# Patient Record
Sex: FEMALE | Race: WHITE | URBAN - METROPOLITAN AREA
[De-identification: names, ages, dates, MRNs, and addresses within clinical notes are randomized per-mention and may not be internally consistent; named-entity substitution may affect disease eponyms.]

---

## 2021-02-23 ENCOUNTER — TELEPHONE (OUTPATIENT)
Dept: OBGYN | Age: 41
End: 2021-02-23

## 2021-03-03 ENCOUNTER — TELEPHONE (OUTPATIENT)
Dept: SCHEDULING | Age: 41
End: 2021-03-03

## 2021-04-27 PROBLEM — F41.9 ANXIETY: Status: ACTIVE | Noted: 2021-04-27

## 2021-04-27 PROBLEM — M79.7 FIBROMYALGIA: Status: ACTIVE | Noted: 2021-04-27

## 2021-04-27 PROBLEM — F32.A DEPRESSION: Status: ACTIVE | Noted: 2021-04-27

## 2021-04-27 NOTE — PROGRESS NOTES
Dee Barrera is a 26-year-old woman self-referred for evaluation of possible fibromyalgia. She has had a sensitive stomach since she was 9years old. On endoscopy, she was found to have a healing ulcer in her stomach 10 years ago.   She has avoided no autoimmune disorders or arthritis. She thinks her mother probably has fibromyalgia. Social history:  She is single. She has a steady boyfriend. Her daughter is 5years old. 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 No cigarettes.   Rare up-to-date article on \"fibromyalgia\". She will try gabapentin 100 to 200 mg, 30 minutes before bedtime. She will gradually increase her exercise program.  She will schedule back in 2 weeks. 2.  She needs to establish with a primary care physician.

## 2021-08-05 RX ORDER — GABAPENTIN 100 MG/1
CAPSULE ORAL
Qty: 60 CAPSULE | Refills: 2 | Status: SHIPPED | OUTPATIENT
Start: 2021-08-05 | End: 2021-10-21

## 2021-08-05 NOTE — TELEPHONE ENCOUNTER
Gabapentin Refill  LOV: 4/27/21  No future appointments. Assessment and plan:    1. Depression, anxiety, panic attack, insomnia, fatigue, diffuse myofascial discomfort. We discussed diffuse myofascial pain syndrome.   She was given the up-to-date mariana

## 2021-08-05 NOTE — TELEPHONE ENCOUNTER
Patient requesting refill for medication below and states out of medication and states having pain being without medication.     •  gabapentin 100 MG Oral Cap, Take 1-2 PO Q HS., Disp: 60 capsule, Rfl: 2

## 2021-10-21 ENCOUNTER — PATIENT MESSAGE (OUTPATIENT)
Dept: RHEUMATOLOGY | Facility: CLINIC | Age: 41
End: 2021-10-21

## 2021-10-21 RX ORDER — GABAPENTIN 100 MG/1
CAPSULE ORAL
Qty: 60 CAPSULE | Refills: 2 | Status: SHIPPED | OUTPATIENT
Start: 2021-10-21

## 2021-10-21 NOTE — TELEPHONE ENCOUNTER
Last filled: 9/24/21 #30 cap with 0 refills   LOV: 4/27/21  Future Appointments   Date Time Provider Tl Liat   10/22/2021  1:00 PM Sachi Lancaster, PT [facility]  [address]   10/26/2021  2:00 PM Kimberli Valenzuela, PT [facility]  [facility]   1

## 2021-10-22 NOTE — TELEPHONE ENCOUNTER
From: Denise Huffman MD  To: Eddie Avila  Sent: 10/21/2021 2:23 PM CDT  Subject: Gabapentin refill. I just refilled your gabapentin prescription for 3 months. I hope that you are doing well.     Please schedule a follow-up appointment within

## 2022-02-21 ENCOUNTER — TELEPHONE (OUTPATIENT)
Dept: SCHEDULING | Age: 42
End: 2022-02-21

## 2022-02-23 ENCOUNTER — TELEPHONE (OUTPATIENT)
Dept: SCHEDULING | Age: 42
End: 2022-02-23

## 2022-02-23 ENCOUNTER — NURSE TRIAGE (OUTPATIENT)
Dept: FAMILY MEDICINE CLINIC | Facility: CLINIC | Age: 42
End: 2022-02-23

## 2022-02-24 ENCOUNTER — APPOINTMENT (OUTPATIENT)
Dept: FAMILY MEDICINE | Age: 42
End: 2022-02-24

## 2022-03-03 ENCOUNTER — APPOINTMENT (OUTPATIENT)
Dept: FAMILY MEDICINE | Age: 42
End: 2022-03-03

## 2022-04-28 ENCOUNTER — PATIENT MESSAGE (OUTPATIENT)
Dept: FAMILY MEDICINE CLINIC | Facility: CLINIC | Age: 42
End: 2022-04-28

## 2022-04-28 NOTE — TELEPHONE ENCOUNTER
Please review refill protocol failed/ no protocol  Requested Prescriptions   Pending Prescriptions Disp Refills    ALPRAZolam 0.25 MG Oral Tab 30 tablet 0     Sig: Take 1 tablet (0.25 mg total) by mouth nightly as needed for Sleep or Anxiety.         There is no refill protocol information for this order

## 2022-04-30 RX ORDER — ALPRAZOLAM 0.25 MG/1
0.25 TABLET ORAL NIGHTLY PRN
Qty: 30 TABLET | Refills: 0 | Status: SHIPPED | OUTPATIENT
Start: 2022-04-30

## 2022-05-04 ENCOUNTER — TELEPHONE (OUTPATIENT)
Dept: FAMILY MEDICINE CLINIC | Facility: CLINIC | Age: 42
End: 2022-05-04

## 2022-05-04 RX ORDER — GABAPENTIN 300 MG/1
300 CAPSULE ORAL NIGHTLY
Qty: 90 CAPSULE | Refills: 0 | Status: SHIPPED | OUTPATIENT
Start: 2022-05-04

## 2022-05-13 ENCOUNTER — HOSPITAL ENCOUNTER (EMERGENCY)
Age: 42
Discharge: LEFT BEFORE DISCHARGE | End: 2022-05-13

## 2022-05-13 VITALS
SYSTOLIC BLOOD PRESSURE: 116 MMHG | HEART RATE: 81 BPM | OXYGEN SATURATION: 97 % | TEMPERATURE: 98.1 F | RESPIRATION RATE: 16 BRPM | DIASTOLIC BLOOD PRESSURE: 75 MMHG

## 2022-05-13 RX ORDER — ALPRAZOLAM 0.25 MG/1
0.25 TABLET ORAL
COMMUNITY
Start: 2022-04-30

## 2022-05-13 RX ORDER — GABAPENTIN 300 MG/1
300 CAPSULE ORAL
COMMUNITY
Start: 2022-05-03

## 2022-06-07 NOTE — TELEPHONE ENCOUNTER
Please call patient to review her requests. Can schedule in July for physical to address these issues soon.  Res 24 is ok

## 2022-06-08 RX ORDER — ALPRAZOLAM 0.25 MG/1
0.25 TABLET ORAL NIGHTLY PRN
Qty: 30 TABLET | Refills: 0 | Status: SHIPPED | OUTPATIENT
Start: 2022-06-08

## 2022-06-08 NOTE — TELEPHONE ENCOUNTER
MyChart sent. CSS=please call and schedule, see DR Gonzalez's note below, can schedule an earlier annual physical in July (currently schedule for September ) and use res 24 for medication refill. thanks.        Future Appointments   Date Time Provider Tl Josue   6/21/2022  2:00 PM Jalene Closs, [facility]   9/13/2022  1:30 PM MD NADYA Page C[facility]ER HADLEYO

## 2022-06-08 NOTE — TELEPHONE ENCOUNTER
CSS agents, please call patient and assist patient with rescheduling for earlier appointment for physical. Thank you. Per Dr. David Her, can use a Res24 slot for a time in July to have annual physical earlier than already scheduled appointment in September (see previous charting note from Dr. David Her below).     Future Appointments   Date Time Provider Tl Josue   6/21/2022  2:00 PM Kirstin Puga [facility] [place] [facility]   9/13/2022  1:30 PM MD NADYA [facility] ADO

## 2022-06-08 NOTE — TELEPHONE ENCOUNTER
Patient returned call, agreed to earlier appt for physical, re-scheduled for 7/12/22, soonest available due to work schedule. Patient requesting if able to have refill of Alprazolam until her appt, she will need refill for next week, reports will come in for sooner appt if needed. Please advise. *patient requesting call back to confirm if refill is approved.

## 2022-06-10 NOTE — TELEPHONE ENCOUNTER
Spoke, with the patient and confirmed her appt date. Patient's refill had been sent to the pharmacy.

## 2022-07-12 ENCOUNTER — OFFICE VISIT (OUTPATIENT)
Dept: FAMILY MEDICINE CLINIC | Facility: CLINIC | Age: 42
End: 2022-07-12
Payer: COMMERCIAL

## 2022-07-12 VITALS
BODY MASS INDEX: 36.22 KG/M2 | TEMPERATURE: 98 F | HEART RATE: 70 BPM | SYSTOLIC BLOOD PRESSURE: 112 MMHG | HEIGHT: 66 IN | WEIGHT: 225.38 LBS | DIASTOLIC BLOOD PRESSURE: 76 MMHG

## 2022-07-12 DIAGNOSIS — E56.9 VITAMIN DEFICIENCY: ICD-10-CM

## 2022-07-12 DIAGNOSIS — M79.7 FIBROMYALGIA: Primary | ICD-10-CM

## 2022-07-12 DIAGNOSIS — N92.6 IRREGULAR MENSES: ICD-10-CM

## 2022-07-12 DIAGNOSIS — F41.9 ANXIETY: ICD-10-CM

## 2022-07-12 DIAGNOSIS — Z00.00 ROUTINE MEDICAL EXAM: ICD-10-CM

## 2022-07-12 PROCEDURE — 99396 PREV VISIT EST AGE 40-64: CPT | Performed by: FAMILY MEDICINE

## 2022-07-12 PROCEDURE — 3074F SYST BP LT 130 MM HG: CPT | Performed by: FAMILY MEDICINE

## 2022-07-12 PROCEDURE — 3078F DIAST BP <80 MM HG: CPT | Performed by: FAMILY MEDICINE

## 2022-07-12 PROCEDURE — 3008F BODY MASS INDEX DOCD: CPT | Performed by: FAMILY MEDICINE

## 2022-07-12 RX ORDER — GABAPENTIN 300 MG/1
300 CAPSULE ORAL NIGHTLY
Qty: 90 CAPSULE | Refills: 1 | Status: SHIPPED | OUTPATIENT
Start: 2022-07-12

## 2022-08-20 ENCOUNTER — PATIENT MESSAGE (OUTPATIENT)
Dept: FAMILY MEDICINE CLINIC | Facility: CLINIC | Age: 42
End: 2022-08-20

## 2022-08-22 RX ORDER — ALPRAZOLAM 0.25 MG/1
0.25 TABLET ORAL NIGHTLY PRN
Qty: 30 TABLET | Refills: 0 | Status: SHIPPED | OUTPATIENT
Start: 2022-08-22

## 2022-09-16 ENCOUNTER — LAB ENCOUNTER (OUTPATIENT)
Dept: LAB | Age: 42
End: 2022-09-16
Attending: FAMILY MEDICINE

## 2022-09-16 DIAGNOSIS — E56.9 VITAMIN DEFICIENCY: ICD-10-CM

## 2022-09-16 DIAGNOSIS — N92.6 IRREGULAR MENSES: ICD-10-CM

## 2022-09-16 DIAGNOSIS — Z00.00 ROUTINE MEDICAL EXAM: ICD-10-CM

## 2022-09-16 LAB
ALBUMIN SERPL-MCNC: 3.6 G/DL (ref 3.4–5)
ALBUMIN/GLOB SERPL: 1 {RATIO} (ref 1–2)
ALP LIVER SERPL-CCNC: 85 U/L
ALT SERPL-CCNC: 32 U/L
ANION GAP SERPL CALC-SCNC: 10 MMOL/L (ref 0–18)
AST SERPL-CCNC: 19 U/L (ref 15–37)
BASOPHILS # BLD AUTO: 0.03 X10(3) UL (ref 0–0.2)
BASOPHILS NFR BLD AUTO: 0.5 %
BILIRUB SERPL-MCNC: 1.6 MG/DL (ref 0.1–2)
BUN BLD-MCNC: 10 MG/DL (ref 7–18)
BUN/CREAT SERPL: 12.3 (ref 10–20)
CALCIUM BLD-MCNC: 9.2 MG/DL (ref 8.5–10.1)
CHLORIDE SERPL-SCNC: 103 MMOL/L (ref 98–112)
CHOLEST SERPL-MCNC: 189 MG/DL (ref ?–200)
CO2 SERPL-SCNC: 23 MMOL/L (ref 21–32)
CREAT BLD-MCNC: 0.81 MG/DL
DEPRECATED RDW RBC AUTO: 38.6 FL (ref 35.1–46.3)
EOSINOPHIL # BLD AUTO: 0.04 X10(3) UL (ref 0–0.7)
EOSINOPHIL NFR BLD AUTO: 0.6 %
ERYTHROCYTE [DISTWIDTH] IN BLOOD BY AUTOMATED COUNT: 12.3 % (ref 11–15)
FASTING PATIENT LIPID ANSWER: YES
FASTING STATUS PATIENT QL REPORTED: YES
GFR SERPLBLD BASED ON 1.73 SQ M-ARVRAT: 93 ML/MIN/1.73M2 (ref 60–?)
GLOBULIN PLAS-MCNC: 3.7 G/DL (ref 2.8–4.4)
GLUCOSE BLD-MCNC: 87 MG/DL (ref 70–99)
HCT VFR BLD AUTO: 45.7 %
HDLC SERPL-MCNC: 65 MG/DL (ref 40–59)
HGB BLD-MCNC: 15.5 G/DL
IMM GRANULOCYTES # BLD AUTO: 0.01 X10(3) UL (ref 0–1)
IMM GRANULOCYTES NFR BLD: 0.2 %
INSULIN SERPL-ACNC: 16 MU/L (ref 3–25)
LDLC SERPL CALC-MCNC: 106 MG/DL (ref ?–100)
LYMPHOCYTES # BLD AUTO: 2.63 X10(3) UL (ref 1–4)
LYMPHOCYTES NFR BLD AUTO: 39.9 %
MCH RBC QN AUTO: 29.2 PG (ref 26–34)
MCHC RBC AUTO-ENTMCNC: 33.9 G/DL (ref 31–37)
MCV RBC AUTO: 86.2 FL
MONOCYTES # BLD AUTO: 0.58 X10(3) UL (ref 0.1–1)
MONOCYTES NFR BLD AUTO: 8.8 %
NEUTROPHILS # BLD AUTO: 3.3 X10 (3) UL (ref 1.5–7.7)
NEUTROPHILS # BLD AUTO: 3.3 X10(3) UL (ref 1.5–7.7)
NEUTROPHILS NFR BLD AUTO: 50 %
NONHDLC SERPL-MCNC: 124 MG/DL (ref ?–130)
OSMOLALITY SERPL CALC.SUM OF ELEC: 280 MOSM/KG (ref 275–295)
PLATELET # BLD AUTO: 250 10(3)UL (ref 150–450)
POTASSIUM SERPL-SCNC: 3.9 MMOL/L (ref 3.5–5.1)
PROT SERPL-MCNC: 7.3 G/DL (ref 6.4–8.2)
RBC # BLD AUTO: 5.3 X10(6)UL
SODIUM SERPL-SCNC: 136 MMOL/L (ref 136–145)
TRIGL SERPL-MCNC: 101 MG/DL (ref 30–149)
TSI SER-ACNC: 1.6 MIU/ML (ref 0.36–3.74)
VIT B12 SERPL-MCNC: 407 PG/ML (ref 193–986)
VIT D+METAB SERPL-MCNC: 28.1 NG/ML (ref 30–100)
VLDLC SERPL CALC-MCNC: 17 MG/DL (ref 0–30)
WBC # BLD AUTO: 6.6 X10(3) UL (ref 4–11)

## 2022-09-16 PROCEDURE — 84402 ASSAY OF FREE TESTOSTERONE: CPT

## 2022-09-16 PROCEDURE — 84403 ASSAY OF TOTAL TESTOSTERONE: CPT

## 2022-09-16 PROCEDURE — 82306 VITAMIN D 25 HYDROXY: CPT

## 2022-09-16 PROCEDURE — 83525 ASSAY OF INSULIN: CPT

## 2022-09-16 PROCEDURE — 84443 ASSAY THYROID STIM HORMONE: CPT

## 2022-09-16 PROCEDURE — 36415 COLL VENOUS BLD VENIPUNCTURE: CPT

## 2022-09-16 PROCEDURE — 82607 VITAMIN B-12: CPT

## 2022-09-16 PROCEDURE — 80061 LIPID PANEL: CPT

## 2022-09-16 PROCEDURE — 85025 COMPLETE CBC W/AUTO DIFF WBC: CPT

## 2022-09-16 PROCEDURE — 80053 COMPREHEN METABOLIC PANEL: CPT

## 2022-09-21 NOTE — PROGRESS NOTES
JAVON Lange - Your labs are all normal except mildly decreased vitamin D levels - please take extra vitamin D 2000 units daily.   I am waiting to hear back about the appear for another surgery opinion. - Dr. Rudolph Thomas

## 2022-09-22 RX ORDER — VITAMIN B COMPLEX
3000 TABLET ORAL DAILY
COMMUNITY
Start: 2022-09-22

## 2022-09-22 NOTE — PROGRESS NOTES
Please disregard last sentence about \"surgery status\" - error on my part.    My apologies, Dr. Tyler Oneil

## 2022-09-24 LAB
TESTOSTERONE, FREE, S: 0.69 NG/DL
TESTOSTERONE, TOTAL, S: 41 NG/DL

## 2022-09-29 ENCOUNTER — PATIENT MESSAGE (OUTPATIENT)
Dept: FAMILY MEDICINE CLINIC | Facility: CLINIC | Age: 42
End: 2022-09-29

## 2022-09-30 NOTE — TELEPHONE ENCOUNTER
Dr Carroll Dassamantha also second message regarding gabapentin 400 mg.      Future Appointments   Date Time Provider Tl Josue   10/15/2022 11:30 AM Kyle Neely MD [facility]

## 2022-10-01 RX ORDER — ALPRAZOLAM 0.25 MG/1
0.25 TABLET ORAL NIGHTLY PRN
Qty: 30 TABLET | Refills: 0 | Status: SHIPPED | OUTPATIENT
Start: 2022-10-01

## 2022-10-01 RX ORDER — GABAPENTIN 400 MG/1
400 CAPSULE ORAL 3 TIMES DAILY
Qty: 90 CAPSULE | Refills: 0 | Status: SHIPPED | OUTPATIENT
Start: 2022-10-01

## 2022-10-15 ENCOUNTER — TELEMEDICINE (OUTPATIENT)
Dept: FAMILY MEDICINE CLINIC | Facility: CLINIC | Age: 42
End: 2022-10-15

## 2022-10-15 DIAGNOSIS — Z12.31 VISIT FOR SCREENING MAMMOGRAM: ICD-10-CM

## 2022-10-15 DIAGNOSIS — F41.9 ANXIETY: ICD-10-CM

## 2022-10-15 DIAGNOSIS — F32.A DEPRESSION, UNSPECIFIED DEPRESSION TYPE: ICD-10-CM

## 2022-10-15 DIAGNOSIS — M79.7 FIBROMYALGIA: Primary | ICD-10-CM

## 2022-10-15 PROCEDURE — 99214 OFFICE O/P EST MOD 30 MIN: CPT | Performed by: FAMILY MEDICINE

## 2022-10-15 RX ORDER — DICLOFENAC SODIUM AND MISOPROSTOL 50; 200 MG/1; UG/1
1 TABLET, DELAYED RELEASE ORAL 2 TIMES DAILY
Qty: 60 TABLET | Refills: 0 | Status: SHIPPED | OUTPATIENT
Start: 2022-10-15

## 2022-10-18 ENCOUNTER — PATIENT MESSAGE (OUTPATIENT)
Dept: FAMILY MEDICINE CLINIC | Facility: CLINIC | Age: 42
End: 2022-10-18

## 2022-10-25 RX ORDER — DICLOFENAC SODIUM AND MISOPROSTOL 50; 200 MG/1; UG/1
1 TABLET, DELAYED RELEASE ORAL 2 TIMES DAILY
Qty: 60 TABLET | Refills: 0 | Status: SHIPPED | OUTPATIENT
Start: 2022-10-25

## 2022-10-25 NOTE — TELEPHONE ENCOUNTER
From: Rina Skinner MD  To: Richard Nunez  Sent: 10/18/2022 12:02 PM CDT  Subject: sorry for late reply    Kameron Moctezumadarnell Closs getting back to you later than I expected. I reviewed recommended medications and still same as before. First line is usually Cymbalta but next would be TCA type medications like Nortriptyline or Amitriptyline in evenings or Prozac. Prozac seems to be the most studied SSRI for Fibromyalgia. I would recommend trying that and starting at 10 mg and you can continue on gabapentin. You can also try IBgard for the abdominal discomfort/bloating and gas. It is a peppermint oil supplement but start very slow. Patients reports good results but just start with one capsule daily and drink 2-3 glasses of water with it. Once again, sorry for sending this a bit late. We have a provider out on maternity leave so I am a bit behind on follow ups.    Let me know what you think. - Dr. Ludy Villanueva

## 2022-12-28 RX ORDER — GABAPENTIN 400 MG/1
400 CAPSULE ORAL 3 TIMES DAILY
Qty: 90 CAPSULE | Refills: 0 | Status: SHIPPED | OUTPATIENT
Start: 2022-12-28

## 2022-12-28 NOTE — TELEPHONE ENCOUNTER
Refill passed per [place] [facility] protocol. Med passed protocol, has high interaction, cannot proceed from here  Routing as Dr. Elizabeth Zimmerman is out of office     Requested Prescriptions   Pending Prescriptions Disp Refills    gabapentin 400 MG Oral Cap 90 capsule 0     Sig: Take 1 capsule (400 mg total) by mouth 3 (three) times daily.        Neurology Medications Passed - 12/27/2022  4:56 PM        Passed - In person appointment or virtual visit in the past 6 mos or appointment in next 3 mos     Recent Outpatient Visits              2 months ago 2 Lucero Rosa MD    Telemedicine    5 months ago Eva Rosario MD    Office Visit    10 months ago Lucero Benjamin MD    Office Visit    1 year ago [address], [address] [place]    Office Visit    1 year ago [address], PT    Office Visit                         Recent Outpatient Visits              2 months ago 2 Lucero Rosa MD    Telemedicine    5 months ago Angelina Luna MD    Office Visit    10 months ago Juanita Kelly MD    Office Visit    1 year ago [address], PT    Office Visit    1 year ago [address], [address]    Office Visit

## 2023-01-19 RX ORDER — ALPRAZOLAM 0.25 MG/1
0.25 TABLET ORAL NIGHTLY PRN
Qty: 30 TABLET | Refills: 1 | Status: CANCELLED | OUTPATIENT
Start: 2023-01-19

## 2023-01-20 ENCOUNTER — PATIENT MESSAGE (OUTPATIENT)
Dept: FAMILY MEDICINE CLINIC | Facility: CLINIC | Age: 43
End: 2023-01-20

## 2023-01-20 NOTE — TELEPHONE ENCOUNTER
Patient is calling back to confirm the following medication will be refilled as one more refill is noted for ALPRAZOLAM .   Please advise if patient will need to set up an appointment for a next refill in February. Patient is also requesting the following:    cycloopenzapine    Please advise.

## 2023-01-20 NOTE — TELEPHONE ENCOUNTER
[facility] [address] AT [address], [phone], [phone]      Disp Refills Start End    ALPRAZolam 0.25 MG Oral Tab 30 tablet 1 12/19/2022     Sig - Route:  Take 1 tablet (0.25 mg total) by mouth nightly as needed for Sleep or Anxiety. - Oral    Sent to pharmacy as: ALPRAZolam 0.25 MG Oral Tablet (Xanax)    E-Prescribing Status: Receipt confirmed by pharmacy (12/19/2022 12:28 PM CST)

## 2023-01-21 RX ORDER — CYCLOBENZAPRINE HCL 10 MG
10 TABLET ORAL 2 TIMES DAILY PRN
Qty: 60 TABLET | Refills: 1 | Status: SHIPPED | OUTPATIENT
Start: 2023-01-21 | End: 2023-02-10

## 2023-02-07 RX ORDER — ALPRAZOLAM 0.25 MG/1
0.25 TABLET ORAL NIGHTLY PRN
Qty: 30 TABLET | Refills: 2 | Status: SHIPPED | OUTPATIENT
Start: 2023-02-07

## 2023-02-07 NOTE — TELEPHONE ENCOUNTER
Patient called stating she would like to have refills (at least 1) when she asks for fills. ALPRAZolam 0.25 MG Oral Tab (to be completed by 2/19/23)    Patient will be going out of town 3/2023, she is asking for Rx refill with additional fills. I made her aware I would request above. She was made aware it is PCP's discretion as to how often patient needs to follow-up. I offered a follow-up visit with Dr. Kuhn Backbone scheduled. Future Appointments   Date Time Provider Tl Josue   4/25/2023  1:15 PM Olvin Winslow MD [facility]     Rx pended for Dr. Deirdre Ramírez to review and authorize if appropriate.

## 2023-02-20 RX ORDER — ALPRAZOLAM 0.25 MG/1
TABLET ORAL
Qty: 30 TABLET | Refills: 0 | OUTPATIENT
Start: 2023-02-20

## 2023-04-01 ENCOUNTER — TELEPHONE (OUTPATIENT)
Dept: FAMILY MEDICINE CLINIC | Facility: CLINIC | Age: 43
End: 2023-04-01

## 2023-04-01 RX ORDER — ALPRAZOLAM 0.25 MG/1
0.25 TABLET ORAL 2 TIMES DAILY PRN
Qty: 60 TABLET | Refills: 2 | Status: SHIPPED | OUTPATIENT
Start: 2023-04-01

## 2023-04-01 NOTE — TELEPHONE ENCOUNTER
RN called patient. Patient's date of birth and full name both confirmed. RN informed of provider's message as detailed below. She verbalizes understanding of all information, and agreeable to plan.        Future Appointments   Date Time Provider Tl Liat   4/21/2023  1:00 PM FELIX DOBBINS 1 FELIX DOBBINS  [place]   4/25/2023  1:15 PM [facility]

## 2023-07-01 RX ORDER — GABAPENTIN 400 MG/1
400 CAPSULE ORAL NIGHTLY
Qty: 90 CAPSULE | Refills: 1 | OUTPATIENT
Start: 2023-07-01

## 2023-10-17 RX ORDER — ALPRAZOLAM 0.25 MG/1
0.25 TABLET ORAL 2 TIMES DAILY PRN
Qty: 60 TABLET | Refills: 2 | OUTPATIENT
Start: 2023-10-17

## 2023-10-18 RX ORDER — ALPRAZOLAM 0.25 MG/1
0.25 TABLET ORAL 2 TIMES DAILY PRN
Qty: 60 TABLET | Refills: 0 | Status: SHIPPED | OUTPATIENT
Start: 2023-10-18

## 2023-10-18 NOTE — TELEPHONE ENCOUNTER
Please review refill protocol failed/ no protocol  Requested Prescriptions   Pending Prescriptions Disp Refills    ALPRAZOLAM 0.25 MG Oral Tab [Pharmacy Med Name: ALPRAZOLAM 0.25MG TABLETS] 60 tablet 0     Sig: TAKE 1 TABLET(0.25 MG) BY MOUTH TWICE DAILY AS NEEDED FOR SLEEP OR ANXIETY       There is no refill protocol information for this order

## 2023-10-24 ENCOUNTER — OFFICE VISIT (OUTPATIENT)
Dept: FAMILY MEDICINE CLINIC | Facility: CLINIC | Age: 43
End: 2023-10-24

## 2023-10-24 ENCOUNTER — NURSE TRIAGE (OUTPATIENT)
Dept: FAMILY MEDICINE CLINIC | Facility: CLINIC | Age: 43
End: 2023-10-24

## 2023-10-24 VITALS
SYSTOLIC BLOOD PRESSURE: 136 MMHG | BODY MASS INDEX: 34 KG/M2 | WEIGHT: 210.63 LBS | DIASTOLIC BLOOD PRESSURE: 76 MMHG | HEART RATE: 55 BPM

## 2023-10-24 DIAGNOSIS — R42 DIZZINESS: Primary | ICD-10-CM

## 2023-10-24 DIAGNOSIS — M79.7 FIBROMYALGIA: ICD-10-CM

## 2023-10-24 DIAGNOSIS — Z00.00 ROUTINE MEDICAL EXAM: ICD-10-CM

## 2023-10-24 DIAGNOSIS — F41.9 ANXIETY: ICD-10-CM

## 2023-10-24 DIAGNOSIS — E56.9 VITAMIN DEFICIENCY: ICD-10-CM

## 2023-10-24 DIAGNOSIS — Z12.31 SCREENING MAMMOGRAM FOR BREAST CANCER: ICD-10-CM

## 2023-10-24 PROCEDURE — 3078F DIAST BP <80 MM HG: CPT | Performed by: FAMILY MEDICINE

## 2023-10-24 PROCEDURE — 99214 OFFICE O/P EST MOD 30 MIN: CPT | Performed by: FAMILY MEDICINE

## 2023-10-24 PROCEDURE — 3075F SYST BP GE 130 - 139MM HG: CPT | Performed by: FAMILY MEDICINE

## 2023-10-24 RX ORDER — MECLIZINE HYDROCHLORIDE 25 MG/1
25 TABLET ORAL 3 TIMES DAILY PRN
Qty: 21 TABLET | Refills: 0 | Status: SHIPPED | OUTPATIENT
Start: 2023-10-24

## 2023-10-24 RX ORDER — LEVOCETIRIZINE DIHYDROCHLORIDE 5 MG/1
5 TABLET, FILM COATED ORAL EVERY EVENING
Qty: 30 TABLET | Refills: 0 | Status: SHIPPED | OUTPATIENT
Start: 2023-10-24

## 2023-10-24 RX ORDER — GABAPENTIN 400 MG/1
400 CAPSULE ORAL NIGHTLY
COMMUNITY
Start: 2023-10-24 | End: 2023-10-28

## 2023-10-24 NOTE — TELEPHONE ENCOUNTER
Per conversation with Dr. Cobb Client, ok to add on at 1pm today. Pt contacted and confirmed 1pm.  Confirmed [place] office location. Call transferred to \[facility] [place] [facility]\"" to assist with appointment.     Future Appointments   Date Time Provider Tl Josue   10/24/2023  1:00 PM Tawanna Camarillo MD [facility] ADO

## 2023-10-24 NOTE — TELEPHONE ENCOUNTER
Action Requested: Summary for Provider     []  Critical Lab, Recommendations Needed  [x] Need Additional Advice  []   FYI    []   Need Orders  [] Need Medications Sent to Pharmacy  []  Other     SUMMARY: Pt reports dizziness for 1 week, worsening. Denies vomiting, active bleeding. Pt states she has a history of fibromyalgia, unable to have good night sleep for the last few days. Pt has been drinking extra water. Appointment scheduled with ALVARO Hameed today in [place], but Pt is asking if Dr. Gerardo Solano can add her on to her schedule instead today, as she knows her history. Please advise.       Reason for call: Dizziness (A few weeks ago-minor)  Onset: Data Unavailable                     Reason for Disposition   MODERATE dizziness (e.g., interferes with normal activities) (Exception: dizziness caused by heat exposure, sudden standing, or poor fluid intake)    Protocols used: Dizziness-A-[place]

## 2023-10-25 RX ORDER — LEVOCETIRIZINE DIHYDROCHLORIDE 5 MG/1
5 TABLET, FILM COATED ORAL EVERY EVENING
Qty: 90 TABLET | Refills: 0 | OUTPATIENT
Start: 2023-10-25

## 2023-10-27 ENCOUNTER — TELEPHONE (OUTPATIENT)
Dept: FAMILY MEDICINE CLINIC | Facility: CLINIC | Age: 43
End: 2023-10-27

## 2023-10-27 NOTE — TELEPHONE ENCOUNTER
Patient requesting a letter from Dr Krishan Womack for her  documenting pt has Fibromyalgia, \"specifically document her symptoms, really driving -- it needs to be conveyed with the issue of getting cold, that she can't allow her body to get cold, it will cause her a flare-up. Include in letter: Patient has a car start in her car to warm it up in the winter so she does not get cold. It's necessary as part of her health regimen to keep her out of pain. \"    Requesting this letter by Monday.     Fax to: pt's fax at work:   [phone]

## 2023-10-28 RX ORDER — GABAPENTIN 400 MG/1
400 CAPSULE ORAL 3 TIMES DAILY
Qty: 270 CAPSULE | Refills: 3 | Status: SHIPPED | OUTPATIENT
Start: 2023-10-28

## 2023-10-28 NOTE — TELEPHONE ENCOUNTER
[employer] message sent for  fasting labs instruction.   Refill passed per [facility] protocol.     Requested Prescriptions   Pending Prescriptions Disp Refills    GABAPENTIN 400 MG Oral Cap [Pharmacy Med Name: GABAPENTIN 400MG CAPSULES] 90 capsule 0     Sig: TAKE 1 CAPSULE(400 MG) BY MOUTH THREE TIMES DAILY       Neurology Medications Passed - 10/27/2023  8:02 AM        Passed - In person appointment or virtual visit in the past 6 mos or appointment in next 3 mos     Recent Out[facility]its              [facility]    [facility]    6 months ago Anxiety    [facility] Nara Gamboa MD    Office Visit    1 year ago [facility]romyalgia    [facility]    Tele[facility]icine    1 year ago [facility]romy[facility]    Office Visit    1 year ago [facility]    [facility]          Future Appointments         Provider Department Appt Notes    In 1 week Nara Gonzalez MD [facility] px, last px 7/12/22, ok per MA                            Future Appointments         Provider Department Appt Notes    In 1 week Nara Gonzalez MD [facility] px, last px 7/12/22, ok per MA             Recent Outpatient Visits              4 days ago [facility]    Office Visit    6 months ago [facility]    [facility]    Office Visit    1 year ago [facility]romyalgia    [facility]    Telemedicine    1 year ago [facility]  [place], Nara Gamboa MD    Office Visit    1 year ago Anxiety    [facility] [place], Nara Gamboa MD    Office Visit

## 2023-10-30 NOTE — TELEPHONE ENCOUNTER
Dr. Cobb Client, patient requesting new letter pended for review/approval under communications tab. Thank you. Per chart review, patient requested letter by today. Contacted patient to provide update (name and  of patient verified). Patient states she received fax, and reviewed letter. She explains she needs letter to convey that she cannot allow her body to get cold. She explains her body does not regulate temperature. When the temperature falls below 32 degrees her bones ache and it is extremely painful just walking outside for minutes. She has meeting this week and is requesting letter as soon as possible. She is requesting a new letter as worded below, if possible:  Jaun Lee suffers from Fibromyalgia which will worsen in the winter due to the cold temperatures. Her body does not regulate temperature effectively and she cannot allow her body temperature to fall in cold weather. She needs to take necessary measures to keep herself within normal temperatures such as warming her car before entering it with a car start. Failure to do so will result in fibromyalgia flare up, significant pain, and mobility impairment. It is a necessary part of her routine care and treatment for Fibromyalgia and it's many symptoms. If you require additional information please contact our office.

## 2023-10-30 NOTE — TELEPHONE ENCOUNTER
Letter faxed via Right[employer]ax, confirmation received. Per chart review, patient is MyChart active. LK [facility] message sent.

## 2023-11-01 NOTE — TELEPHONE ENCOUNTER
Per chart review, letter was signed by Dr. Cammie Collins. Left message that updated letter was signed; to call back and transfer to triage if she needs further assistnace  Updated letter faxed via Right[employer]ax, failed fax transmission received. Second attempt made, failed fax transmission received.   [employer] message sent to update patient

## 2023-11-02 ENCOUNTER — TELEPHONE (OUTPATIENT)
Dept: FAMILY MEDICINE CLINIC | Facility: CLINIC | Age: 43
End: 2023-11-02

## 2023-11-02 ENCOUNTER — LAB ENCOUNTER (OUTPATIENT)
Dept: LAB | Age: 43
End: 2023-11-02
Attending: FAMILY MEDICINE
Payer: COMMERCIAL

## 2023-11-02 DIAGNOSIS — E56.9 VITAMIN DEFICIENCY: ICD-10-CM

## 2023-11-02 DIAGNOSIS — Z00.00 ROUTINE MEDICAL EXAM: ICD-10-CM

## 2023-11-02 LAB
ALBUMIN SERPL-MCNC: 4.3 G/DL (ref 3.2–4.8)
ALBUMIN/GLOB SERPL: 1.7 {RATIO} (ref 1–2)
ALP LIVER SERPL-CCNC: 75 U/L
ALT SERPL-CCNC: 11 U/L
ANION GAP SERPL CALC-SCNC: 8 MMOL/L (ref 0–18)
AST SERPL-CCNC: 15 U/L (ref ?–34)
BASOPHILS # BLD AUTO: 0.03 X10(3) UL (ref 0–0.2)
BASOPHILS NFR BLD AUTO: 0.6 %
BILIRUB SERPL-MCNC: 1.5 MG/DL (ref 0.3–1.2)
BUN BLD-MCNC: 8 MG/DL (ref 9–23)
BUN/CREAT SERPL: 10 (ref 10–20)
CALCIUM BLD-MCNC: 9.5 MG/DL (ref 8.7–10.4)
CHLORIDE SERPL-SCNC: 103 MMOL/L (ref 98–112)
CHOLEST SERPL-MCNC: 218 MG/DL (ref ?–200)
CO2 SERPL-SCNC: 26 MMOL/L (ref 21–32)
CREAT BLD-MCNC: 0.8 MG/DL
DEPRECATED RDW RBC AUTO: 39.4 FL (ref 35.1–46.3)
EGFRCR SERPLBLD CKD-EPI 2021: 94 ML/MIN/1.73M2 (ref 60–?)
EOSINOPHIL # BLD AUTO: 0.05 X10(3) UL (ref 0–0.7)
EOSINOPHIL NFR BLD AUTO: 1 %
ERYTHROCYTE [DISTWIDTH] IN BLOOD BY AUTOMATED COUNT: 12.7 % (ref 11–15)
FASTING PATIENT LIPID ANSWER: YES
FASTING STATUS PATIENT QL REPORTED: YES
GLOBULIN PLAS-MCNC: 2.6 G/DL (ref 2.8–4.4)
GLUCOSE BLD-MCNC: 94 MG/DL (ref 70–99)
HCT VFR BLD AUTO: 45.6 %
HDLC SERPL-MCNC: 54 MG/DL (ref 40–59)
HGB BLD-MCNC: 15.7 G/DL
IMM GRANULOCYTES # BLD AUTO: 0.01 X10(3) UL (ref 0–1)
IMM GRANULOCYTES NFR BLD: 0.2 %
INSULIN SERPL-ACNC: 10.5 MU/L (ref 3–25)
LDLC SERPL CALC-MCNC: 148 MG/DL (ref ?–100)
LYMPHOCYTES # BLD AUTO: 2.06 X10(3) UL (ref 1–4)
LYMPHOCYTES NFR BLD AUTO: 42.7 %
MCH RBC QN AUTO: 29.1 PG (ref 26–34)
MCHC RBC AUTO-ENTMCNC: 34.4 G/DL (ref 31–37)
MCV RBC AUTO: 84.6 FL
MONOCYTES # BLD AUTO: 0.42 X10(3) UL (ref 0.1–1)
MONOCYTES NFR BLD AUTO: 8.7 %
NEUTROPHILS # BLD AUTO: 2.26 X10 (3) UL (ref 1.5–7.7)
NEUTROPHILS # BLD AUTO: 2.26 X10(3) UL (ref 1.5–7.7)
NEUTROPHILS NFR BLD AUTO: 46.8 %
NONHDLC SERPL-MCNC: 164 MG/DL (ref ?–130)
OSMOLALITY SERPL CALC.SUM OF ELEC: 282 MOSM/KG (ref 275–295)
PLATELET # BLD AUTO: 244 10(3)UL (ref 150–450)
POTASSIUM SERPL-SCNC: 4.1 MMOL/L (ref 3.5–5.1)
PROT SERPL-MCNC: 6.9 G/DL (ref 5.7–8.2)
RBC # BLD AUTO: 5.39 X10(6)UL
SODIUM SERPL-SCNC: 137 MMOL/L (ref 136–145)
T4 FREE SERPL-MCNC: 1.1 NG/DL (ref 0.8–1.7)
TRIGL SERPL-MCNC: 90 MG/DL (ref 30–149)
TSI SER-ACNC: 1.71 MIU/ML (ref 0.55–4.78)
VIT B12 SERPL-MCNC: 323 PG/ML (ref 211–911)
VIT D+METAB SERPL-MCNC: 19.9 NG/ML (ref 30–100)
VLDLC SERPL CALC-MCNC: 17 MG/DL (ref 0–30)
WBC # BLD AUTO: 4.8 X10(3) UL (ref 4–11)

## 2023-11-02 PROCEDURE — 85025 COMPLETE CBC W/AUTO DIFF WBC: CPT

## 2023-11-02 PROCEDURE — 83525 ASSAY OF INSULIN: CPT

## 2023-11-02 PROCEDURE — 84439 ASSAY OF FREE THYROXINE: CPT

## 2023-11-02 PROCEDURE — 36415 COLL VENOUS BLD VENIPUNCTURE: CPT

## 2023-11-02 PROCEDURE — 84443 ASSAY THYROID STIM HORMONE: CPT

## 2023-11-02 PROCEDURE — 82306 VITAMIN D 25 HYDROXY: CPT

## 2023-11-02 PROCEDURE — 82607 VITAMIN B-12: CPT

## 2023-11-02 PROCEDURE — 80061 LIPID PANEL: CPT

## 2023-11-02 PROCEDURE — 80053 COMPREHEN METABOLIC PANEL: CPT

## 2023-11-02 NOTE — TELEPHONE ENCOUNTER
Patient called and said she needs additional information added to the letter please look at encounter from 10/27. Her court date is tomorrow so will be needing it asap.   Include when diagnosed with fibromyalgia    said It needs to state in detail and convey how brittle bones are and how much pain she is in. Original letter does not go into enough medical detail. Needs to show how fibromyalgia works. Needs it to be more scientific.     Wants the doctor to be notified.     Patient is requesting to speak to a nurse or  today.

## 2023-11-02 NOTE — TELEPHONE ENCOUNTER
I have sent a second letter as requested. Fibromyalgia does not cause \"brittle bones\". Dexa scan would diagnose osteoporosis which Nikia has never had. Can give both letters.   I saw pt on 10/24/2023 and never mentioned these requests.

## 2023-11-03 NOTE — TELEPHONE ENCOUNTER
Dr. Ludy Villanueva, please see follow up messages from patient. RN's only thought is to refer patient to a website or possibly a journal article to supplement her inquiry. Dr. Ludy Villanueva please advise on any recommendations.

## 2023-11-07 ENCOUNTER — OFFICE VISIT (OUTPATIENT)
Dept: FAMILY MEDICINE CLINIC | Facility: CLINIC | Age: 43
End: 2023-11-07

## 2023-11-07 VITALS — HEIGHT: 66 IN | WEIGHT: 210.63 LBS | BODY MASS INDEX: 33.85 KG/M2

## 2023-11-07 DIAGNOSIS — F41.9 ANXIETY: ICD-10-CM

## 2023-11-07 DIAGNOSIS — E56.9 VITAMIN DEFICIENCY: ICD-10-CM

## 2023-11-07 DIAGNOSIS — E78.5 HYPERLIPIDEMIA, UNSPECIFIED HYPERLIPIDEMIA TYPE: ICD-10-CM

## 2023-11-07 DIAGNOSIS — M79.7 FIBROMYALGIA: Primary | ICD-10-CM

## 2023-11-07 DIAGNOSIS — Z00.00 ROUTINE MEDICAL EXAM: ICD-10-CM

## 2023-11-07 PROCEDURE — 3008F BODY MASS INDEX DOCD: CPT | Performed by: FAMILY MEDICINE

## 2023-11-07 PROCEDURE — 99396 PREV VISIT EST AGE 40-64: CPT | Performed by: FAMILY MEDICINE

## 2023-11-07 RX ORDER — GABAPENTIN 100 MG/1
100 CAPSULE ORAL NIGHTLY
Qty: 90 CAPSULE | Refills: 0 | Status: SHIPPED | OUTPATIENT
Start: 2023-11-07

## 2023-11-16 ENCOUNTER — TELEPHONE (OUTPATIENT)
Dept: FAMILY MEDICINE CLINIC | Facility: CLINIC | Age: 43
End: 2023-11-16

## 2023-11-16 NOTE — TELEPHONE ENCOUNTER
Dr. Esther Rockwell - Regarding Letter 11/2/23, patient wants to talk with you Directly  Please call patient as soon as you can today. Phone verified [phone]        Patient called office. Date of birth and full name both confirmed. Requesting call back from Dr. Esther Rockwell. Declines to disclose what is needed, but says it is in regards to a Work Note that needs modification, confirms it is the 11/2/23 note.

## 2023-11-29 ENCOUNTER — PATIENT MESSAGE (OUTPATIENT)
Dept: FAMILY MEDICINE CLINIC | Facility: CLINIC | Age: 43
End: 2023-11-29

## 2023-12-05 ENCOUNTER — TELEPHONE (OUTPATIENT)
Dept: FAMILY MEDICINE CLINIC | Facility: CLINIC | Age: 43
End: 2023-12-05

## 2023-12-05 NOTE — TELEPHONE ENCOUNTER
Spoke with pt,  verified. Pt called asking for a letter from Dr Janett Rodney stated she was dx'd with Fibromyalgia so she can provide a copy to her . She stated a letter given to her before by [facility], it wasn't noted that she was dx'd with Fibromyalgia. Pt was advised she needs to contact [facility] dept and clarify her dx. Pt was also informed per chart reviewed, letter generated on  23 and 23, noted diagnosis of Fibromyalgia. Pt stated she doesn't need to get a new letter as she needs to review previous letter on her mychart with her . No further action needed at this time.        ELIANA

## 2023-12-13 RX ORDER — ALPRAZOLAM 0.25 MG/1
0.25 TABLET ORAL 2 TIMES DAILY PRN
Qty: 60 TABLET | Refills: 3 | Status: SHIPPED | OUTPATIENT
Start: 2023-12-13

## 2023-12-13 NOTE — TELEPHONE ENCOUNTER
Please review. Protocol failed or has no protocol.     Requested Prescriptions   Pending Prescriptions Disp Refills    ALPRAZOLAM 0.25 MG Oral Tab [Pharmacy Med Name: ALPRAZOLAM 0.25MG TABLETS] 60 tablet 0     Sig: TAKE 1 TABLET(0.25 MG) BY MOUTH TWICE DAILY AS NEEDED FOR SLEEP OR ANXIETY       There is no refill protocol information for this order          Recent Outpatient Visits              1 month ago Tessie Amezcua, Giovana Guevara MD    Office Visit    1 month ago Gabrielle Vazquez, Giovana Guevara MD    Office Visit    7 months ago Tessie Chambers, Giovana Guevara MD    Office Visit    1 year ago Tessie Amezcua, Giovana Guevara MD    Telemedicine    1 year ago Tessie Amezcua, Calli Cedeno MD    Office Visit

## 2024-03-19 RX ORDER — GABAPENTIN 100 MG/1
CAPSULE ORAL
Qty: 90 CAPSULE | Refills: 0 | OUTPATIENT
Start: 2024-03-19

## 2024-03-19 NOTE — TELEPHONE ENCOUNTER
Refill passed per [facility] protocol.    *charted, patient is stable on total nightly dose of 500mg (100mg + 400mg)    Please review pended refill request as unable to refill due to high/very high drug warning copied here:    High  Duplicate Therapy: gabapentinGABAPENTIN AND RELATED. Abuse/Dependency Potential.    Requested Prescriptions   Pending Prescriptions Disp Refills    gabapentin 100 MG Oral Cap 90 capsule 0     Sig: Take 1 capsule (100 mg total) by mouth nightly. Plus 400 mg at night       Neurology Medications Passed - 3/17/2024  1:08 PM        Passed - In person appointment or virtual visit in the past 6 mos or appointment in next 3 mos     Recent Outpatient Visits              4 [facility]    Office Visit    4 months ago [facility]izz[facility]    Office Visit    10 months ago Anxiety    [facility] [place] [facility], [facility]    Office Visit    1 year ago Fibromyalgia    [facility]    Telemedicine    1 year ago Fibromyalgia    [facility] [facility] Laura [facility], [facility]    Office Visit          Future Appointments         Provider Department Appt Notes    In 1 month [facility] Usual bi-yearly blood test but can we include testing hormone levels to check for menopause?

## 2024-03-20 RX ORDER — GABAPENTIN 100 MG/1
100 CAPSULE ORAL NIGHTLY
Qty: 90 CAPSULE | Refills: 3 | Status: SHIPPED | OUTPATIENT
Start: 2024-03-20

## 2024-05-14 ENCOUNTER — LAB ENCOUNTER (OUTPATIENT)
Dept: LAB | Age: 44
End: 2024-05-14
Attending: FAMILY MEDICINE

## 2024-05-14 DIAGNOSIS — E56.9 VITAMIN DEFICIENCY: ICD-10-CM

## 2024-05-14 DIAGNOSIS — N92.6 IRREGULAR MENSES: ICD-10-CM

## 2024-05-14 DIAGNOSIS — E78.5 HYPERLIPIDEMIA, UNSPECIFIED HYPERLIPIDEMIA TYPE: ICD-10-CM

## 2024-05-14 LAB
ALBUMIN SERPL-MCNC: 4 G/DL (ref 3.2–4.8)
ALBUMIN/GLOB SERPL: 1.4 {RATIO} (ref 1–2)
ALP LIVER SERPL-CCNC: 78 U/L
ALT SERPL-CCNC: 19 U/L
ANION GAP SERPL CALC-SCNC: 6 MMOL/L (ref 0–18)
AST SERPL-CCNC: 22 U/L (ref ?–34)
BILIRUB SERPL-MCNC: 1.5 MG/DL (ref 0.3–1.2)
BUN BLD-MCNC: 11 MG/DL (ref 9–23)
BUN/CREAT SERPL: 14.5 (ref 10–20)
CALCIUM BLD-MCNC: 9.4 MG/DL (ref 8.7–10.4)
CHLORIDE SERPL-SCNC: 105 MMOL/L (ref 98–112)
CHOLEST SERPL-MCNC: 209 MG/DL (ref ?–200)
CO2 SERPL-SCNC: 29 MMOL/L (ref 21–32)
CREAT BLD-MCNC: 0.76 MG/DL
EGFRCR SERPLBLD CKD-EPI 2021: 100 ML/MIN/1.73M2 (ref 60–?)
FASTING PATIENT LIPID ANSWER: NO
FASTING STATUS PATIENT QL REPORTED: NO
FSH SERPL-ACNC: 80.7 MIU/ML
GLOBULIN PLAS-MCNC: 2.8 G/DL (ref 2–3.5)
GLUCOSE BLD-MCNC: 92 MG/DL (ref 70–99)
HDLC SERPL-MCNC: 66 MG/DL (ref 40–59)
LDLC SERPL CALC-MCNC: 129 MG/DL (ref ?–100)
NONHDLC SERPL-MCNC: 143 MG/DL (ref ?–130)
OSMOLALITY SERPL CALC.SUM OF ELEC: 289 MOSM/KG (ref 275–295)
POTASSIUM SERPL-SCNC: 4.3 MMOL/L (ref 3.5–5.1)
PROT SERPL-MCNC: 6.8 G/DL (ref 5.7–8.2)
SODIUM SERPL-SCNC: 140 MMOL/L (ref 136–145)
TRIGL SERPL-MCNC: 77 MG/DL (ref 30–149)
VIT D+METAB SERPL-MCNC: 44.4 NG/ML (ref 30–100)
VLDLC SERPL CALC-MCNC: 14 MG/DL (ref 0–30)

## 2024-05-14 PROCEDURE — 36415 COLL VENOUS BLD VENIPUNCTURE: CPT

## 2024-05-14 PROCEDURE — 83001 ASSAY OF GONADOTROPIN (FSH): CPT

## 2024-05-14 PROCEDURE — 80053 COMPREHEN METABOLIC PANEL: CPT

## 2024-05-14 PROCEDURE — 80061 LIPID PANEL: CPT

## 2024-05-14 PROCEDURE — 82306 VITAMIN D 25 HYDROXY: CPT

## 2024-05-15 ENCOUNTER — PATIENT MESSAGE (OUTPATIENT)
Dept: FAMILY MEDICINE CLINIC | Facility: CLINIC | Age: 44
End: 2024-05-15

## 2024-05-15 DIAGNOSIS — N95.1 MENOPAUSAL SYMPTOMS: Primary | ICD-10-CM

## 2024-05-15 NOTE — PROGRESS NOTES
Kameron Montejo - Based on your FSH and symptoms, you are in menopause. I did add on those labs as your requested but not needed to give the diagnosis. I recommend you see a gynecologist Dr. Rosales to best treat your symptoms.  I have placed a referral. These labs get released to patients automatically. I receive a significant amount of results daily and cannot review right away - up to 5-7 days. We will contact you about a virtual appointment. I am sorry I was not able to see you yesterday but if patients are more than 15 minutes later, I have to move on to the next patient that is there or everyone is behind on schedule. I try very hard to stay on time if possible. - Dr. Gonzalez

## 2024-05-15 NOTE — TELEPHONE ENCOUNTER
[Also see [employer] message from yesterday, 5/15/24]    Dr. Gonzalez - please review [employer] message and advise

## 2024-05-17 NOTE — TELEPHONE ENCOUNTER
Called patient in regards to result notes and mentioned about the MyChart ( identified name and date of birth )     Appointment made    Future Appointments   Date Time Provider Department Center   5/21/2024  2:30 PM [facility]

## 2024-05-21 ENCOUNTER — TELEMEDICINE (OUTPATIENT)
Dept: FAMILY MEDICINE CLINIC | Facility: CLINIC | Age: 44
End: 2024-05-21

## 2024-05-21 DIAGNOSIS — M79.7 FIBROMYALGIA: ICD-10-CM

## 2024-05-21 DIAGNOSIS — Z12.83 SCREENING EXAM FOR SKIN CANCER: Primary | ICD-10-CM

## 2024-05-21 DIAGNOSIS — N95.1 MENOPAUSAL SYMPTOMS: ICD-10-CM

## 2024-05-21 DIAGNOSIS — Z12.31 SCREENING MAMMOGRAM FOR BREAST CANCER: ICD-10-CM

## 2024-05-21 DIAGNOSIS — K80.20 GALLSTONES: ICD-10-CM

## 2024-05-21 NOTE — PROGRESS NOTES
This visit is conducted using Telemedicine with live, interactive video and audio.    Patient has been referred to the [facility] website at [email or web address] to review the yearly Consent to Treat document.    Patient understands and accepts financial responsibility for any deductible, co-insurance and/or co-pays associated with this service.    Nikia Wasserman is a 44 year old female.  HPI:   Reports menopausal symptoms are prominent - hot flashes, fatigue, decreased libido.   Concerned about her gallstones - feeling more discomfort on right upper quadrant.   Plans to do infra red screening for mammogram but open to do traditional mammogram also this year.   Would like try natural medications for menopause first.   Would like to see dermatologist for overall skin check - multiple moles.   Current Outpatient Medications on File Prior to Visit   Medication Sig Dispense Refill    gabapentin 100 MG Oral Cap Take 1 capsule (100 mg total) by mouth nightly. Take with gabapentin 400mg tablet for total nightly dose of 500mg 90 capsule 3    ALPRAZolam 0.25 MG Oral Tab Take 1 tablet (0.25 mg total) by mouth 2 (two) times daily as needed for Sleep or Anxiety. 60 tablet 3    gabapentin 400 MG Oral Cap Take 1 capsule (400 mg total) by mouth 3 (three) times daily. 270 capsule 3    meclizine 25 MG Oral Tab Take 1 tablet (25 mg total) by mouth 3 (three) times daily as needed. 21 tablet 0    levocetirizine 5 MG Oral Tab Take 1 tablet (5 mg total) by mouth every evening. 30 tablet 0    Cholecalciferol (VITAMIN D) 25 MCG (1000 UT) Oral Tab Take 3,000 Units by mouth daily. (Patient not taking: Reported on 10/24/2023)      Aloe Vera External Gel Apply topically. (Patient not taking: Reported on 4/25/2023)      metRONIDAZOLE 0.75 % External Cream APPLY A THIN LAYER TO THE AFFECTED AREA ON FACE TWICE DAILY (Patient not taking: Reported on 4/25/2023)       No current facility-administered medications on file prior to visit.      Past  Medical History:    Anxiety    Depression      Social History:  Social History     Socioeconomic History    Marital status: Single   Tobacco Use    Smoking status: Former    Smokeless tobacco: Never   Vaping Use    Vaping status: Never Used   Substance and Sexual Activity    Alcohol use: Yes     Comment: socially     Drug use: Yes     Comment: RSO oil        REVIEW OF SYSTEMS:   Review of Systems   See HPI     EXAM:   There were no vitals taken for this visit.  GENERAL: well developed, well nourished,in no apparent distress  Speaking in complete sentences.   ASSESSMENT AND PLAN:   1. Screening exam for skin cancer    - Derm Referral - In Network  - Derm Referral - In Network    2. Menopausal symptoms  Try over the counter Estroven, Black Cohosh. Regular exercise. Avoid alcohol, caffeine, spicy foods, hot temperature liquids and foods.     3. Fibromyalgia  Gabapentin     4. Gallstones  Check US  - US GALLBLADDER (CPT=76705); Future    5. Screening mammogram for breast cancer    - [facility] LUIS 2D+3D SCREENING BILAT (CPT=77067/51677); Future      The patient indicates understanding of these issues and agrees to the plan.      Nara Gonzalez MD  5/21/2024  2:36 PM

## 2024-07-06 ENCOUNTER — PATIENT MESSAGE (OUTPATIENT)
Dept: FAMILY MEDICINE CLINIC | Facility: CLINIC | Age: 44
End: 2024-07-06

## 2024-07-06 DIAGNOSIS — R10.9 ABDOMINAL BLOATING WITH CRAMPS: Primary | ICD-10-CM

## 2024-07-06 DIAGNOSIS — R14.0 ABDOMINAL BLOATING WITH CRAMPS: Primary | ICD-10-CM

## 2024-08-27 ENCOUNTER — HOSPITAL ENCOUNTER (OUTPATIENT)
Dept: MAMMOGRAPHY | Age: 44
Discharge: HOME | End: 2024-08-27
Attending: FAMILY MEDICINE
Payer: COMMERCIAL

## 2024-08-27 DIAGNOSIS — Z12.31 SCREENING MAMMOGRAM FOR BREAST CANCER: ICD-10-CM

## 2024-08-27 PROCEDURE — 77067 SCR MAMMO BI INCL CAD: CPT | Performed by: FAMILY MEDICINE

## 2024-08-27 PROCEDURE — 77063 BREAST TOMOSYNTHESIS BI: CPT | Performed by: FAMILY MEDICINE

## 2024-09-12 RX ORDER — LEVOCETIRIZINE DIHYDROCHLORIDE 5 MG/1
5 TABLET, FILM COATED ORAL EVERY EVENING
Qty: 30 TABLET | Refills: 0 | OUTPATIENT
Start: 2024-09-12

## 2024-09-12 NOTE — TELEPHONE ENCOUNTER
Levocetirizine: last prescribed for 1 month supply in 10/2023. Sent Triage message to patient to call the office

## 2024-09-25 ENCOUNTER — OFFICE VISIT (OUTPATIENT)
Facility: CLINIC | Age: 44
End: 2024-09-25

## 2024-09-25 ENCOUNTER — TELEPHONE (OUTPATIENT)
Facility: CLINIC | Age: 44
End: 2024-09-25

## 2024-09-25 VITALS — WEIGHT: 194.19 LBS | HEIGHT: 66 IN | BODY MASS INDEX: 31.21 KG/M2

## 2024-09-25 DIAGNOSIS — Z12.11 SPECIAL SCREENING FOR MALIGNANT NEOPLASMS, COLON: Primary | ICD-10-CM

## 2024-09-25 DIAGNOSIS — K62.89 RECTAL PAIN: ICD-10-CM

## 2024-09-25 DIAGNOSIS — R19.4 CHANGE IN BOWEL HABITS: Primary | ICD-10-CM

## 2024-09-25 DIAGNOSIS — R10.84 GENERALIZED ABDOMINAL PAIN: ICD-10-CM

## 2024-09-25 DIAGNOSIS — Z87.19 HISTORY OF CHOLELITHIASIS: ICD-10-CM

## 2024-09-25 DIAGNOSIS — Z87.11 HISTORY OF GASTRIC ULCER: ICD-10-CM

## 2024-09-25 DIAGNOSIS — R19.7 DIARRHEA, UNSPECIFIED TYPE: ICD-10-CM

## 2024-09-25 PROCEDURE — 3008F BODY MASS INDEX DOCD: CPT | Performed by: NURSE PRACTITIONER

## 2024-09-25 PROCEDURE — 99244 OFF/OP CNSLTJ NEW/EST MOD 40: CPT | Performed by: NURSE PRACTITIONER

## 2024-09-25 RX ORDER — SODIUM, POTASSIUM,MAG SULFATES 17.5-3.13G
SOLUTION, RECONSTITUTED, ORAL ORAL
Qty: 1 EACH | Refills: 0 | Status: SHIPPED | OUTPATIENT
Start: 2024-09-25

## 2024-09-25 NOTE — H&P
[facility] - Gastroenterology                                                                                                               Reason for consult: rectal pain, change in bowel habits    Requesting physician or provider: No primary care provider on file.    Chief Complaint   Patient presents with    Consult     For Pain in her rectum Inside  it comes and go , stomach issues       HPI:   Nikia Wasserman is a 44 year old year-old female with active diagnoses including depression, anxiety, fibromyalgia, chronic gastritis. Prior medical/surgical history in note table.    Patient states she has a long history of GI issues since childhood. Has previous diagnosis of IBS but doesn't feel comfortable with that diagnosis. Has never had colonoscopy to rule out any other diagnosis. She is here today for evaluation of new onset rectal pain. Pain started June 2024, thinks after eating gluten/dairy free cupcake at her daughter's birthday. That day saw some blood when wiping but has not seen blood since. Does not remember if she was having diarrhea at that time. Feels that whenever she eats any processed foods she feels pain in rectum, not specifically associated with diarrhea or constipation.  For the past few weeks she has been feeling nauseous and having some diarrhea and then constipation. States that rectal pain is always there, not associated with position or activity.     Is very strict with diet, eats high fiber foods, avoids gluten and dairy for >10 years.  Mostly eats chicken, salmon, rice, vegetables. Barely ever eats any processed foods. Feels when she eats anything different feels inflamed in her stomach and can tell where the food is as it is being digested.     Last GI visit 2021 at [facility] for left sided abdominal pain. Peptic duodenitis found on biopsy. Had prior EGD in about 2014 where an ulcer was found. Has  been on IB guard for years, feels she cannot eat without taking it or she has an increase in pain. Takes gabapentin x years, knows if sometimes slows down her digestion. Has chronic pain due to fibromyalgia and therefor has issue defibrinating origin of pain. Has chronic pain in ribs.     Has history of gallstone. Has not followed up with surgery because she is nervous to have it removed with all her current GI symptoms. Has lost 20 lbs without trying since December 2023 due to diet restrictions.       Last colonoscopy: never  Last EGD:  9/10/21 at [facility]:  Pre-op Diagnosis: Abdominal pain, unspecified abdominal location [R10.9]      Post-Op Diagnosis:                ESOPHAGUS:  GE junction at 35 cm. Normal appearing esophageal mucosa.                STOMACH:  Normal appearing stomach mucosa. Gastric biopsy for r/o h pylori               DUODENUM:  Normal duodenal exam (examined up to 2nd portion). Duodenal biopsy taken for rule out celiac disease  A. Duodenum; biopsy:  -Duodenal mucosa with focal heterotopic gastric mucosa and changes suggestive of peptic duodenitis  -Villous architecture otherwise intact, negative for increased intraepithelial lymphocytes    B. Stomach; biopsy:  -Antral type mucosa with reactive changes  -Oxyntic mucosa with no significant pathologic changes  -Negative for Helicobacter       NSAIDS: none  Tobacco: none  Alcohol: none  Marijuana: has medical card, uses marijuana to sleep   Illicit drugs: none    FH GI malignancy: none  FH IBD: none    No history of adverse reaction to sedation  No CLARISSE  No anticoagulants/antiplatelet  No pacemaker/defibrillator    Wt Readings from Last 6 Encounters:   09/25/24 194 lb 3.2 oz (88.1 kg)   11/07/23 210 lb 9.6 oz (95.5 kg)   10/24/23 210 lb 9.6 oz (95.5 kg)   04/25/23 210 lb 3.2 oz (95.3 kg)   07/12/22 225 lb 6.4 oz (102.2 kg)   02/24/22 221 lb 3.2 oz (100.3 kg)        History, Medications, Allergies, ROS:      Past Medical History:    Anxiety     Depression      Past Surgical History:   Procedure Laterality Date            Family Hx:   Family History   Problem Relation Age of Onset    Lipids Father     Dementia Maternal Grandmother     Diabetes Maternal Grandfather     Breast Cancer Maternal Aunt         60's      Social History:   Social History     Socioeconomic History    Marital status: Single   Tobacco Use    Smoking status: Former    Smokeless tobacco: Never   Vaping Use    Vaping status: Never Used   Substance and Sexual Activity    Alcohol use: Yes     Comment: socially     Drug use: Yes     Comment: RSO oil        Medications (Active prior to today's visit):  Current Outpatient Medications   Medication Sig Dispense Refill    Na Sulfate-K Sulfate-Mg Sulf (SUPREP BOWEL PREP KIT) 17.5-3.13-1.6 GM/177ML Oral Solution Suprep kit for colonoscopy prep. To be taken per office instructions. 1 each 0    ALPRAZolam 0.25 MG Oral Tab Take 1 tablet (0.25 mg total) by mouth 2 (two) times daily as needed for Sleep or Anxiety. 60 tablet 0    gabapentin 100 MG Oral Cap Take 1 capsule (100 mg total) by mouth nightly. Take with gabapentin 400mg tablet for total nightly dose of 500mg 90 capsule 3    gabapentin 400 MG Oral Cap Take 1 capsule (400 mg total) by mouth 3 (three) times daily. 270 capsule 3    Cholecalciferol (VITAMIN D) 25 MCG (1000 UT) Oral Tab Take 3,000 Units by mouth daily.      Aloe Vera External Gel Apply topically. (Patient not taking: Reported on 2023)         Allergies:  Allergies   Allergen Reactions    Lactose Intolerant DIARRHEA, FATIGUE, NAUSEA ONLY, PAIN, RESTLESSNESS and SWELLING    Gluten Flour OTHER (SEE COMMENTS)     intolerance    Mold Runny nose     congestion       ROS:   CONSTITUTIONAL: negative for fevers, chills, sweats  EYES Negative for scleral icterus or redness, and diplopia  HEENT: Negative for hoarseness  RESPIRATORY: Negative for cough and severe shortness of breath  CARDIOVASCULAR: Negative for crushing  sub-sternal chest pain  GASTROINTESTINAL: See HPI  GENITOURINARY: Negative for dysuria  MUSCULOSKELETAL: Negative for arthralgias and myalgias  SKIN: Negative for jaundice, rash or pruritus  NEUROLOGICAL: Negative for dizziness and headaches  BEHAVIOR/PSYCH: Negative for psychotic behavior    PHYSICAL EXAM:   Height 5' 6\" (1.676 m), weight 194 lb 3.2 oz (88.1 kg), last menstrual period 03/05/2024.    GEN: Alert, no acute distress, well-nourished   HEENT: anicteric sclera, neck supple, trachea midline, MMM, no palpable or tender neck or supraclavicular lymph nodes  CV: RRR, the extremities are warm and well perfused   LUNGS: No increased work of breathing, CTAB  ABDOMEN: Soft, symmetrical, non-tender without distention or guarding. No scars or lesions. Aorta is without bruit or visible pulsation. Umbilicus is midline without herniation. Normoactive bowel sounds are present, No masses, hepatomegaly or splenomegaly noted.  MSK: No erythema, no warmth, no swelling of joints  SKIN: No jaundice, no erythema, no rashes, no lesions  HEMATOLOGIC: No bleeding, no bruising  NEURO: Alert and interactive, GRAMAJO  PSYCH: appropriate mood & affect    Labs/Imaging/Procedures:     FINDINGS:   LIVER: A macrolobulated T2-hyperintense lesion is noted in the right hepatic lobe posteriorly,   measuring 2.2 x 1.3 x 1.2 cm, with discontiguous peripheral nodular enhancement that progressively   fills in over time, consistent with hemangioma. A very tiny nonenhancing focus in the left lobe   anteriorly on axial series 504 image 17 is too small to catheterize further. No other intrahepatic   lesion.   GALLBLADDER/BILIARY TREE: Cholelithiasis. Small phrygian cap at the fundus.   PANCREAS: Unremarkable.   SPLEEN: Unremarkable.   ADRENAL GLANDS: Unremarkable.   KIDNEYS: Unremarkable.   ABDOMINAL AORTA: Unremarkable.   MESENTERY/RETROPERITONEUM: Unremarkable.   BOWEL: Limited.   ASCITES/SUGGESTION OF FREE AIR: No upper abdominal ascites.   OSSEOUS  STRUCTURES: Unremarkable.   LUNG BASES: Limited.   On coronal series, the uterus is partially visualized. Tiny T2-hyperintense focus along the right   lateral endometrium is nonspecific. Adenomyosis or other process cannot be excluded. Ultrasound   follow-up might be considered.     Impression   IMPRESSION:   1.  Liver hemangioma.   2.  Cholelithiasis.   3.  Incidentally noted tiny T2-hyperintense focus along the right lateral endometrium, nonspecific.   Adenomyosis or other process cannot be excluded. Ultrasound pelvis follow-up might be considered.           .  ASSESSMENT/PLAN:   44 year old female presents for multiple complaints.     1. Change in bowel habits    2. Generalized abdominal pain    3. History of gastric ulcer    4. Rectal pain    5. History of cholelithiasis    Patient wishes to go ahead with colonoscopy at this time. Feels that changes in diet and medication are not helping her symptoms and rectal pain is not severe but does not seem to be resolving. Declines GHULAM today due to pain, states she does not have any external changes. Declines any sort of hemorrhoid or fissure treatment since bleeding and stopped and medication reactions. Has history of an ulcer per patient, no records available today but does not wish to repeat EGD at this time. Has gallbladder US order from PCP that she will complete. Is anxious about completing prep but declines Zofran prescription today. Small volume prep sent.     Recommendations:  -schedule colonoscopy  -follow up after to discuss results        Orders This Visit:  No orders of the defined types were placed in this encounter.      Meds This Visit:  Requested Prescriptions     Signed Prescriptions Disp Refills    Na Sulfate-K Sulfate-Mg Sulf (SUPREP BOWEL PREP KIT) 17.5-3.13-1.6 GM/177ML Oral Solution 1 each 0     Sig: Suprep kit for colonoscopy prep. To be taken per office instructions.       Imaging & Referrals:  None      ALVARO Terrell    [facility] [address]  Gastroenterology  9/25/2024

## 2024-09-25 NOTE — TELEPHONE ENCOUNTER
Patient was seen in office today with  Alexandria Navas and was given orders to schedule. Please call patient to schedule his/her procedure with the orders given below. Patient was given the phone number and prep instructions at the time of the office visit. The prep instructions was also explained to the patient at the time of the visit. The patient verbalized understanding the prep and that a GI  will call him/her for the procedure.  If patient calls before the 1 week turnaround please schedule with the orders given below, thank you!    Orders from Alexandria Navas      Instructions    Have ultrasound that you already ordered:      Schedule colonoscopy with [facility] Endoscopist  Diagnosis: colon cancer screening, diarrhea, rectal pain  Sedation: MAC  Prep: Suprep     2.  bowel prep from pharmacy   You can pick the bowel prep up now and store in a cool, dry place in your home until your scheduled bowel prep start date.     3. Continue all medications as normal for your procedure. DO NOT TAKE: Any form of alcohol, recreational drugs and any forms of erectile dysfunction medications 24 hours prior to procedure.     4. Read all bowel prep instructions carefully. Bowel prep instructions can also be found online at:  [email or web address]      5. AVOID seeds, nuts, popcorn, raw fruits and vegetables for 5 days before procedure     6. If you start any NEW medication after your visit today, please notify us. Certain medications (like iron or weight loss medications) will need to be held before the procedure, or the procedure cannot be performed safely.

## 2024-09-25 NOTE — PATIENT INSTRUCTIONS
Have ultrasound that you already ordered:     Schedule colonoscopy with [facility] Endoscopist  Diagnosis: colon cancer screening, diarrhea, rectal pain  Sedation: MAC  Prep: Suprep    2.  bowel prep from pharmacy   You can pick the bowel prep up now and store in a cool, dry place in your home until your scheduled bowel prep start date.    3. Continue all medications as normal for your procedure. DO NOT TAKE: Any form of alcohol, recreational drugs and any forms of erectile dysfunction medications 24 hours prior to procedure.    4. Read all bowel prep instructions carefully. Bowel prep instructions can also be found online at:  [email or web address]     5. AVOID seeds, nuts, popcorn, raw fruits and vegetables for 5 days before procedure    6. If you start any NEW medication after your visit today, please notify us. Certain medications (like iron or weight loss medications) will need to be held before the procedure, or the procedure cannot be performed safely.

## 2024-09-26 NOTE — TELEPHONE ENCOUNTER
Scheduled for:  Colonoscopy 86488    Provider Name:  Dr Naidu    Date:  10/1/2024    Location:    [facility]    Sedation:  MAC    Time:  100 pm (Patient made aware [facility] will call the day before with an arrival time)    Prep: Suprep    Meds/Allergies Reconciled?:  Physician reviewed     Diagnosis with codes:    Special screening for malignant neoplasms, colon [Z12.11]   Diarrhea, unspecified type [R19.7]  Rectal pain [K62.89]     Was patient informed to call insurance with codes (Y/N):  Yes, I confirmed [employer] [place] insurance with the patient.     Referral sent?:  N/A    [facility] or [facility] notified?:  I sent an electronic request to Endo Scheduling and received a confirmation today.      Medication Orders:  This patient verbally confirmed that she is not taking:   Iron, blood thinners, BP meds, and is not diabetic   Not latex allergy, Not PCN allergy and does not have a pacemaker     Misc Orders:       Further instructions given by staff:  I discussed the prep instructions with the patient which she verbally understood and is aware that I will send the instructions today via [employer].    Advised patient:    You will not be able to drive, operate machinery or make critical decisions the day of your procedure. Please make arrangements for transportation. You must have a  (age 18 or older) to accompany you, stay in the facility for the duration of your procedure and drive you home after the procedure.  You cannot use public transportation (Uber, Lyft, Taxi). The procedure involves sedation, and you will not be allowed to leave unaccompanied. Your procedure will not proceed forward if you're unable to confirm your  planned to escort you home.

## 2024-09-27 ENCOUNTER — TELEPHONE (OUTPATIENT)
Facility: CLINIC | Age: 44
End: 2024-09-27

## 2024-09-27 NOTE — TELEPHONE ENCOUNTER
RN given message that [facility] has not been able to reach pt to do PAT questions. Pt scheduled for colonoscopy on 10/1/24. RN called pt, she states she just spoke to [facility] 30 min ago. RN inquired if she had any questions about procedure or bowel prep instructions, pt states she has already reviewed them with staff.

## 2024-10-01 PROCEDURE — 88305 TISSUE EXAM BY PATHOLOGIST: CPT | Performed by: INTERNAL MEDICINE

## 2024-11-06 ENCOUNTER — TELEPHONE (OUTPATIENT)
Facility: CLINIC | Age: 44
End: 2024-11-06

## 2024-11-06 NOTE — TELEPHONE ENCOUNTER
----- Message from Mireille Naidu sent at 11/4/2024  7:53 PM CST -----  GI staff: please place recall for colonoscopy in 7 years

## 2024-11-06 NOTE — TELEPHONE ENCOUNTER
Recall colonoscopy in 7 years per Dr. Naidu.     Last done 10/1/2024.      Recall entered into Patient Outreach for 10/1/2031.     Health Maintenance updated.

## 2024-11-19 DIAGNOSIS — F41.9 ANXIETY: ICD-10-CM

## 2024-11-20 DIAGNOSIS — F41.9 ANXIETY: ICD-10-CM

## 2024-11-22 ENCOUNTER — TELEPHONE (OUTPATIENT)
Dept: FAMILY MEDICINE CLINIC | Facility: CLINIC | Age: 44
End: 2024-11-22

## 2024-11-22 RX ORDER — ALPRAZOLAM 0.25 MG/1
0.25 TABLET ORAL 2 TIMES DAILY PRN
Qty: 60 TABLET | Refills: 0 | OUTPATIENT
Start: 2024-11-22

## 2024-11-22 RX ORDER — ALPRAZOLAM 0.25 MG/1
0.25 TABLET ORAL 2 TIMES DAILY PRN
Qty: 60 TABLET | Refills: 0 | Status: SHIPPED | OUTPATIENT
Start: 2024-11-22

## 2024-11-22 NOTE — TELEPHONE ENCOUNTER
Routing to pod mate/alternate provider due to High Priority status and Dr. Gonzalez is out of office.     Please review; protocol failed  Medication request is marked high priority: please advise patient states she is completely out of her alprazolam.     Prescription was already routed to Dr. Gonzalez as high priority.    No future appointments.  Last office visit: 5/21/24 (telemedicine)  Last annual physical exam: 11/7/23    Recent fill dates: 8/31/2024  Date of  last written prescription:      Prescription written on 8/16/24 for qty of: #60 each (processed as a 30 day supply)       - Taken twice daily as needed    Requested Prescriptions   Pending Prescriptions Disp Refills    ALPRAZOLAM 0.25 MG Oral Tab [Pharmacy Med Name: ALPRAZOLAM 0.25MG TABLETS] 60 tablet 0     Sig: TAKE 1 TABLET(0.25 MG) BY MOUTH TWICE DAILY AS NEEDED FOR SLEEP OR ANXIETY       Controlled Substance Medication Failed - 11/22/2024  1:46 PM        Failed - This medication is a controlled substance - forward to provider to refill               Recent Outpatient Visits              1 month ago Change in bowel habits    [facility], Alexandria Lee APRN    Office Visit    6 months ago [facility]    Telemedicine    1 year ago Fibromyalgia    [facility]    Office Visit    1 year ago Dizziness    [facility]    Office Visit    1 year ago Anxiety    [facility]    Office Visit

## 2024-11-22 NOTE — TELEPHONE ENCOUNTER
Patient is requesting a refill on her alprazolam  medication. Per the patient she is out of medication. Pharmacy: [facility]/[place], [place] (Listed)     Current Outpatient Medications   Medication Sig Dispense Refill    ALPRAZolam 0.25 MG Oral Tab Take 1 tablet (0.25 mg total) by mouth 2 (two) times daily as needed for Sleep or Anxiety. 60 tablet 0

## 2024-11-22 NOTE — TELEPHONE ENCOUNTER
Verified name and .    Patient states she is out of medication now and is requesting refill of Xanax.    She is aware that Dr. Gonzalez will be back in office tomorrow 24.    No protocol medication.

## 2024-12-17 ENCOUNTER — LAB ENCOUNTER (OUTPATIENT)
Dept: LAB | Age: 44
End: 2024-12-17
Attending: FAMILY MEDICINE
Payer: COMMERCIAL

## 2024-12-17 DIAGNOSIS — E55.9 VITAMIN D DEFICIENCY: ICD-10-CM

## 2024-12-17 DIAGNOSIS — Z01.419 ENCOUNTER FOR WELL WOMAN EXAM WITH ROUTINE GYNECOLOGICAL EXAM: ICD-10-CM

## 2024-12-17 LAB
ALBUMIN SERPL-MCNC: 4.8 G/DL (ref 3.2–4.8)
ALBUMIN/GLOB SERPL: 1.8 {RATIO} (ref 1–2)
ALP LIVER SERPL-CCNC: 85 U/L
ALT SERPL-CCNC: 14 U/L
ANION GAP SERPL CALC-SCNC: 7 MMOL/L (ref 0–18)
AST SERPL-CCNC: 18 U/L (ref ?–34)
BASOPHILS # BLD AUTO: 0.04 X10(3) UL (ref 0–0.2)
BASOPHILS NFR BLD AUTO: 0.9 %
BILIRUB SERPL-MCNC: 1.9 MG/DL (ref 0.3–1.2)
BUN BLD-MCNC: 12 MG/DL (ref 9–23)
BUN/CREAT SERPL: 13.6 (ref 10–20)
CALCIUM BLD-MCNC: 10.1 MG/DL (ref 8.7–10.4)
CHLORIDE SERPL-SCNC: 106 MMOL/L (ref 98–112)
CHOLEST SERPL-MCNC: 215 MG/DL (ref ?–200)
CO2 SERPL-SCNC: 28 MMOL/L (ref 21–32)
CREAT BLD-MCNC: 0.88 MG/DL
DEPRECATED RDW RBC AUTO: 39.2 FL (ref 35.1–46.3)
EGFRCR SERPLBLD CKD-EPI 2021: 84 ML/MIN/1.73M2 (ref 60–?)
EOSINOPHIL # BLD AUTO: 0.03 X10(3) UL (ref 0–0.7)
EOSINOPHIL NFR BLD AUTO: 0.7 %
ERYTHROCYTE [DISTWIDTH] IN BLOOD BY AUTOMATED COUNT: 12.6 % (ref 11–15)
FASTING PATIENT LIPID ANSWER: NO
FASTING STATUS PATIENT QL REPORTED: NO
GLOBULIN PLAS-MCNC: 2.6 G/DL (ref 2–3.5)
GLUCOSE BLD-MCNC: 89 MG/DL (ref 70–99)
HCT VFR BLD AUTO: 44.9 %
HCV AB SERPL QL IA: NONREACTIVE
HDLC SERPL-MCNC: 76 MG/DL (ref 40–59)
HGB BLD-MCNC: 15.8 G/DL
IMM GRANULOCYTES # BLD AUTO: 0.01 X10(3) UL (ref 0–1)
IMM GRANULOCYTES NFR BLD: 0.2 %
LDLC SERPL CALC-MCNC: 126 MG/DL (ref ?–100)
LYMPHOCYTES # BLD AUTO: 1.77 X10(3) UL (ref 1–4)
LYMPHOCYTES NFR BLD AUTO: 39.2 %
MCH RBC QN AUTO: 30 PG (ref 26–34)
MCHC RBC AUTO-ENTMCNC: 35.2 G/DL (ref 31–37)
MCV RBC AUTO: 85.4 FL
MONOCYTES # BLD AUTO: 0.41 X10(3) UL (ref 0.1–1)
MONOCYTES NFR BLD AUTO: 9.1 %
NEUTROPHILS # BLD AUTO: 2.25 X10 (3) UL (ref 1.5–7.7)
NEUTROPHILS # BLD AUTO: 2.25 X10(3) UL (ref 1.5–7.7)
NEUTROPHILS NFR BLD AUTO: 49.9 %
NONHDLC SERPL-MCNC: 139 MG/DL (ref ?–130)
OSMOLALITY SERPL CALC.SUM OF ELEC: 291 MOSM/KG (ref 275–295)
PLATELET # BLD AUTO: 238 10(3)UL (ref 150–450)
POTASSIUM SERPL-SCNC: 4 MMOL/L (ref 3.5–5.1)
PROT SERPL-MCNC: 7.4 G/DL (ref 5.7–8.2)
RBC # BLD AUTO: 5.26 X10(6)UL
SODIUM SERPL-SCNC: 141 MMOL/L (ref 136–145)
T PALLIDUM AB SER QL IA: NONREACTIVE
TRIGL SERPL-MCNC: 71 MG/DL (ref 30–149)
TSI SER-ACNC: 0.68 UIU/ML (ref 0.55–4.78)
VIT B12 SERPL-MCNC: 326 PG/ML (ref 211–911)
VIT D+METAB SERPL-MCNC: 46.4 NG/ML (ref 30–100)
VLDLC SERPL CALC-MCNC: 13 MG/DL (ref 0–30)
WBC # BLD AUTO: 4.5 X10(3) UL (ref 4–11)

## 2024-12-17 PROCEDURE — 86780 TREPONEMA PALLIDUM: CPT

## 2024-12-17 PROCEDURE — 80061 LIPID PANEL: CPT

## 2024-12-17 PROCEDURE — 80053 COMPREHEN METABOLIC PANEL: CPT

## 2024-12-17 PROCEDURE — 87389 HIV-1 AG W/HIV-1&-2 AB AG IA: CPT

## 2024-12-17 PROCEDURE — 86695 HERPES SIMPLEX TYPE 1 TEST: CPT

## 2024-12-17 PROCEDURE — 85025 COMPLETE CBC W/AUTO DIFF WBC: CPT

## 2024-12-17 PROCEDURE — 86696 HERPES SIMPLEX TYPE 2 TEST: CPT

## 2024-12-17 PROCEDURE — 82607 VITAMIN B-12: CPT

## 2024-12-17 PROCEDURE — 86803 HEPATITIS C AB TEST: CPT

## 2024-12-17 PROCEDURE — 82306 VITAMIN D 25 HYDROXY: CPT

## 2024-12-17 PROCEDURE — 36415 COLL VENOUS BLD VENIPUNCTURE: CPT

## 2024-12-17 PROCEDURE — 84443 ASSAY THYROID STIM HORMONE: CPT

## 2024-12-18 LAB
HSV 1 GLYCOPROTEIN G, IGG: NEGATIVE
HSV 2 GLYCOPROTEIN G, IGG: NEGATIVE

## 2024-12-18 NOTE — PROGRESS NOTES
STI testing was negative. Cholesterol levels are improving. Your HDL - good cholesterol is higher rich is protective so your non HDL level is improving. Keep up with regular exercise and healthy eating. Bilirubin levels are mildly elevated but normal liver enzymes AST/ALT so not concerning. - Dr. Gonzalez

## 2025-03-14 DIAGNOSIS — F41.9 ANXIETY: ICD-10-CM

## 2025-03-18 RX ORDER — ALPRAZOLAM 0.25 MG
0.25 TABLET ORAL 2 TIMES DAILY PRN
Qty: 60 TABLET | Refills: 0 | Status: SHIPPED | OUTPATIENT
Start: 2025-03-18

## 2025-03-18 NOTE — TELEPHONE ENCOUNTER
Please review. Refill failed protocol.     Recent fills each # 60 : 11/22/2024  Last prescription written: 11/22/2024  Last office visit:  12/17/2024    Future Appointments   Date Time Provider Department Center   3/18/2025  3:15 PM Sarbjit Proctor DO [facility]

## 2025-04-25 ENCOUNTER — NURSE TRIAGE (OUTPATIENT)
Dept: FAMILY MEDICINE CLINIC | Facility: CLINIC | Age: 45
End: 2025-04-25

## 2025-04-25 NOTE — TELEPHONE ENCOUNTER
Action Requested: Summary for Provider     []  Critical Lab, Recommendations Needed  [x] Need Additional Advice  []   FYI    []   Need Orders  [] Need Medications Sent to Pharmacy  []  Other     SUMMARY: Patient is requesting advice from Dr. Gonzalez, does not know if she really needs to be seen.     Reason for call: Vaginal Bleeding  Onset: 5 days     Spoke to patient, full name and date of birth verified.    Patient's daughter is 14 and 2 months ago, patient had spotting for 1 day around the time daughter got her menstrual cycle that month.     Patient reports that she has a new intimate partner, first time they had intercourse was 3 weeks ago.     Patient reports intimacy 3 times that weekend, each time it was painful.     Patient saw same partner last weekend with 2 episodes of intimacy Saturday and Sunday.     Sunday evening 4/20/25 patient began spotting, today is day 5.   Patient stated she only had red spotting for 1 day, ever since the color is pink.    Patient is not wearing panty liner, pad, or tampon - reports that she only sees the spotting when urinating, it is only on the tissue.     For 2-3 days patient did notice an odor to her urine, but that has resolved and no change to color of her urine.    Patient believes her last period was March 2024, she had spotting for 11 days at that time.    Patient would like to ask Dr. Gonzalez if this is normal after menopause and going about 9 months without any sexual intimacy.     Patient will schedule appointment with any provider if this is what Dr. Gonzalez recommends.     Patient prefers female provider only, she is off work on Tuesdays and Fridays.    Physical is scheduled for September 2025.     RN informed patient that Dr. Gonzalez is not in the office today, we may not be able to call patient back before Monday.     Patient verbalized understanding, she would prefer Dr. Gonzalez's recommendations before scheduling an appointment.      Reason for Disposition   Postmenopausal  vaginal bleeding    Protocols used: Vaginal Bleeding - [id number]

## 2025-04-25 NOTE — TELEPHONE ENCOUNTER
Patient sent a edupristine message requesting an appointment for the following concern:    Been in menopause for 2 years, 18 months last cycle. After sex 1st time in 9 mo spotting pink blood.

## 2025-04-26 NOTE — TELEPHONE ENCOUNTER
No it is normal if post menopausal.  She should not be bleeding.  I would like her to see Dr. Rosales - gynecologist. Please give her information.

## 2025-04-27 ENCOUNTER — PATIENT OUTREACH (OUTPATIENT)
Dept: CASE MANAGEMENT | Age: 45
End: 2025-04-27

## 2025-04-27 NOTE — PROCEDURES
Received order requesting to update Primary Care Physician (PCP) to Dr. Nara Gonzalez is Approved and finalized on April 27, 2025.

## 2025-04-28 NOTE — TELEPHONE ENCOUNTER
From  Antonella Patrick RN To  Nikia Wasserman Sent and Delivered  4/26/2025 11:15 AM   Last Read in MyChart  4/26/2025 11:16 AM by Nikia Wasserman

## 2025-05-02 ENCOUNTER — OFFICE VISIT (OUTPATIENT)
Dept: OBGYN CLINIC | Facility: CLINIC | Age: 45
End: 2025-05-02

## 2025-05-02 VITALS
SYSTOLIC BLOOD PRESSURE: 126 MMHG | HEART RATE: 82 BPM | DIASTOLIC BLOOD PRESSURE: 88 MMHG | WEIGHT: 192 LBS | HEIGHT: 66 IN | BODY MASS INDEX: 30.86 KG/M2

## 2025-05-02 DIAGNOSIS — N89.8 VAGINAL ODOR: ICD-10-CM

## 2025-05-02 DIAGNOSIS — N95.0 POSTMENOPAUSAL BLEEDING: Primary | ICD-10-CM

## 2025-05-02 PROCEDURE — 3079F DIAST BP 80-89 MM HG: CPT | Performed by: NURSE PRACTITIONER

## 2025-05-02 PROCEDURE — 3074F SYST BP LT 130 MM HG: CPT | Performed by: NURSE PRACTITIONER

## 2025-05-02 PROCEDURE — 99214 OFFICE O/P EST MOD 30 MIN: CPT | Performed by: NURSE PRACTITIONER

## 2025-05-02 PROCEDURE — 3008F BODY MASS INDEX DOCD: CPT | Performed by: NURSE PRACTITIONER

## 2025-05-02 NOTE — PROGRESS NOTES
[facility]   Obstetrics and Gynecology    Nikia Wasserman is a 45 year old female  Patient's last menstrual period was 2024.   Chief Complaint   Patient presents with    Gyn Exam     New patient, patient states bleeding for the first time since last year. Per patient she hasn't had her period since 2024. Patient states spotting started after having intercourse.    New patient.. Last period in 2024. She reports 2 weeks ago was intimate with her partner and that evening and noticed bleeding when wiping. Red to pink in color. Lasted about 6 days. No pelvic pain.  No intercourse since then.  Clio was painful despite using lubricant.    She is noticing an odor, no itching.     She has a new partner in next 9 months, had sti testing in 2024 prior to being sexually active with this partner. Reports he had negative testing also.Declines sti testing today  History of Present Illness    Pap:2024 NILM, negative HPV  Contraception: n/a  Mammogram 2024 normal  OBSTETRICS HISTORY:  OB History    Para Term  AB Living   1 1 1 0 0 1   SAB IAB Ectopic Multiple Live Births   0 0 0 0 1       GYNE HISTORY:  Period Cycle (Days): postmenopausa 2024 (2025  1:29 PM)      History   Sexual Activity    Sexual activity: Yes    Partners: Male       MEDICAL HISTORY:  Past Medical History:    Anxiety    Depression       SOCIAL HISTORY:  Social History     Socioeconomic History    Marital status: Single     Spouse name: Not on file    Number of children: Not on file    Years of education: Not on file    Highest education level: Not on file   Occupational History    Not on file   Tobacco Use    Smoking status: Former    Smokeless tobacco: Never   Vaping Use    Vaping status: Never Used   Substance and Sexual Activity    Alcohol use: Yes     Comment: socially     Drug use: Yes     Comment: RSO oil    Sexual activity: Yes     Partners: Male   Other Topics Concern    Not on file    Social History Narrative    Not on file     Social Drivers of Health     Food Insecurity: Not on file   Transportation Needs: Not on file   Stress: Not on file   Housing Stability: Not on file       MEDICATIONS:    Current Outpatient Medications:     ALPRAZolam 0.25 MG Oral Tab, Take 1 tablet (0.25 mg total) by mouth 2 (two) times daily as needed for Sleep or Anxiety., Disp: 60 tablet, Rfl: 0    gabapentin 100 MG Oral Cap, Take 1 capsule (100 mg total) by mouth nightly. Take with gabapentin 400mg tablet for total nightly dose of 500mg, Disp: 90 capsule, Rfl: 3    gabapentin 400 MG Oral Cap, Take 1 capsule (400 mg total) by mouth 3 (three) times daily. (Patient taking differently: Take 1 capsule (400 mg total) by mouth nightly.), Disp: 270 capsule, Rfl: 3    Cholecalciferol (VITAMIN D) 25 MCG (1000 UT) Oral Tab, Take 3,000 Units by mouth daily., Disp: , Rfl:     Aloe Vera External Gel, Apply topically. (Patient not taking: Reported on 12/17/2024), Disp: , Rfl:     ALLERGIES:    Allergies   Allergen Reactions    Lactose Intolerant DIARRHEA, FATIGUE, NAUSEA ONLY, PAIN, RESTLESSNESS and SWELLING    Gluten Flour OTHER (SEE COMMENTS)     intolerance    Mold Runny nose     congestion         Review of Systems:  Constitutional:  Denies fatigue, night sweats, hot flashes  Cardiovascular:  denies chest pain or palpitations  Respiratory:  denies shortness of breath  Gastrointestinal:  denies heartburn, abdominal pain, diarrhea or constipation  Genitourinary:  denies dysuria, incontinence, abnormal vaginal discharge, vaginal itching see HPI  Musculoskeletal:  denies back pain.  Skin/Breast:  Denies any breast pain, lumps, or discharge.   Neurological:  denies headaches, extremity weakness or numbness.  Psychiatric: denies depression or anxiety.  Endocrine:   denies excessive thirst or urination.  Heme/Lymph:  denies history of anemia, easy bruising or bleeding.      PHYSICAL EXAM:     Vitals:    05/02/25 1326   BP: 126/88    Pulse: 82   Weight: 192 lb (87.1 kg)   Height: 5' 6\" (1.676 m)     Body mass index is 30.99 kg/m².     Patient offered chaperone, patient declined    Constitutional: well developed, well nourished    Psychiatric:  Oriented to time, place, person and situation. Appropriate mood and affect    Pelvic Exam:  External Genitalia: normal appearance, hair distribution, and no lesions  Urethral Meatus:  normal in size, location, without lesions and prolapse  Bladder:  No fullness, masses or tenderness  Vagina:  Normal appearance without lesions, thin white discharge  Cervix:  Normal without tenderness on motion  Uterus: normal in size, contour, position, mobility, without tenderness  Adnexa: normal without masses or tenderness  Perineum: normal  Anus: no hemorroids   Lymph node: no inguinal lymph nodes      Assessment & Plan:    ICD-10-CM    1. Postmenopausal bleeding  N95.0 US PELVIS W EV (CPT=76856/02337)      2. Vaginal odor  N89.8 Vaginitis Vaginosis PCR Panel        Assessment & Plan  Postmenopausal bleeding  Reviewed reasons for bleeding.  Recommend pelvic ultrasound to assess ovaries and uterus  Could be related to painful intercourse/dryness  - Consider endometrial biopsy if ultrasound shows thickened endometrial lining.    Vaginal odor  - culture done  Will call if abnormal. If +bacterial vaginosis - patient prefers metrogel vaginal medication        Ada Curtis, ALVARO        This note was prepared using Dragon Medical voice recognition dictation software. As a result errors may occur. When identified these errors have been corrected. While every attempt is made to correct errors during dictation discrepancies may still exist.

## 2025-05-02 NOTE — PROGRESS NOTES
The following individual(s) verbally consented to be recorded using ambient AI listening technology and understand that they can each withdraw their consent to this listening technology at any point by asking the clinician to turn off or pause the recording:    Patient name: Nikia DONNA Wasserman

## 2025-05-03 LAB
BV BACTERIA DNA VAG QL NAA+PROBE: POSITIVE
C GLABRATA DNA VAG QL NAA+PROBE: NEGATIVE
C KRUSEI DNA VAG QL NAA+PROBE: NEGATIVE
CANDIDA DNA VAG QL NAA+PROBE: NEGATIVE
T VAGINALIS DNA VAG QL NAA+PROBE: NEGATIVE

## 2025-05-09 RX ORDER — GABAPENTIN 100 MG/1
CAPSULE ORAL
Qty: 90 CAPSULE | Refills: 3 | Status: SHIPPED | OUTPATIENT
Start: 2025-05-09

## 2025-05-20 ENCOUNTER — HOSPITAL ENCOUNTER (OUTPATIENT)
Dept: ULTRASOUND IMAGING | Age: 45
Discharge: HOME | End: 2025-05-20
Attending: NURSE PRACTITIONER
Payer: COMMERCIAL

## 2025-05-20 DIAGNOSIS — N95.0 POSTMENOPAUSAL BLEEDING: ICD-10-CM

## 2025-05-20 PROCEDURE — 76856 US EXAM PELVIC COMPLETE: CPT | Performed by: NURSE PRACTITIONER

## 2025-05-20 PROCEDURE — 76830 TRANSVAGINAL US NON-OB: CPT | Performed by: NURSE PRACTITIONER

## 2025-05-21 ENCOUNTER — PATIENT MESSAGE (OUTPATIENT)
Dept: OBGYN CLINIC | Facility: CLINIC | Age: 45
End: 2025-05-21

## 2025-05-21 ENCOUNTER — TELEPHONE (OUTPATIENT)
Dept: OBGYN CLINIC | Facility: CLINIC | Age: 45
End: 2025-05-21

## 2025-05-29 ENCOUNTER — TELEPHONE (OUTPATIENT)
Dept: OBGYN CLINIC | Facility: CLINIC | Age: 45
End: 2025-05-29

## 2025-05-29 NOTE — TELEPHONE ENCOUNTER
Patient was informed of need for endometrial biopsy.  (See 5/21/2024 mychart message.  Appointment scheduled for endometrial biopsy on 6/3/2025.  Patient states she cannot take Motrin and Tylenol does not help at all.  Patient has fibromyalgia and inflammation of her stomach lining.  Patient asked of Ada Curtis has done this procedure on someone with fibromyalgia.  I advised I am unsure but I can ask.  Patient declines having RN ask about this.  Procedure was explained to patient and she asked if there is a way anesthetic can be done at the biopsy site  or if there is an easier way to have biopsy done.  Patient informed the biopsy is of the inside of her uterus.  If the biopsy is not done through the vagina an incision would need to be made into her abdomen and uterus to get to the lining.  Patient advised we can check with Ada Curtis to see if endometrial biopsy can be done under general anesthesia.  Patient declines.  Patient will not be taking any pain meds prior to appointment.  Patient will discuss getting an prescription for pain meds to help with cramping after the exam.    Patient also reports chunky discharge.  States it the shower she was cleaning her vaginal and there was a 1 cm chunk of white discharge that was in her cervix.  I did verify that this was found inside the vagina.  Patient states it looked like cauliflower.  Patient states today there was not more chunks.  Patient denies vaginal itching pr burning.  Patient wanted Ada Curtis to be aware of this and will discuss if testing is needed and her endometrial biopsy appointment.    Message to Ada Curtis as ELIANA.

## 2025-06-03 ENCOUNTER — OFFICE VISIT (OUTPATIENT)
Dept: OBGYN CLINIC | Facility: CLINIC | Age: 45
End: 2025-06-03

## 2025-06-03 VITALS
SYSTOLIC BLOOD PRESSURE: 107 MMHG | WEIGHT: 194.19 LBS | DIASTOLIC BLOOD PRESSURE: 75 MMHG | HEART RATE: 64 BPM | BODY MASS INDEX: 31 KG/M2

## 2025-06-03 DIAGNOSIS — N95.0 POSTMENOPAUSAL BLEEDING: Primary | ICD-10-CM

## 2025-06-03 PROCEDURE — 99212 OFFICE O/P EST SF 10 MIN: CPT | Performed by: NURSE PRACTITIONER

## 2025-06-03 PROCEDURE — 3078F DIAST BP <80 MM HG: CPT | Performed by: NURSE PRACTITIONER

## 2025-06-03 PROCEDURE — 3074F SYST BP LT 130 MM HG: CPT | Performed by: NURSE PRACTITIONER

## 2025-06-03 NOTE — PROGRESS NOTES
[facility]   Obstetrics and Gynecology    Nikia Wasserman is a 45 year old female  Patient's last menstrual period was 2024.   Chief Complaint   Patient presents with    Procedure     EMBX   .   History of Present Illness  Patient last seen on . She had not had a period since 3/2024 then had pink bleeding in April for 6 days after intercourse with wiping.. patient reports intercourse was painful and reports had not been intimate for a while prior to that.    No bleeding since then. No pelvic pain. She had a pelvic ultrasound on :  An ultrasound showed a thickened endometrial lining of 11 millimeters and a small cyst under one centimeter on the left ovary.   I reviewed ultrasound with her today and recommendations for endometrial biopsy. Reviewed procedure in detail, risks, benefits. Reviewed reasons for postmenopausal bleeding - polyps, hyperplasia and uterine cancer. Recommend endometrial evaluation. Patient is concerned about her pain and pain afterward.     Reviewed alternative option is to meet with MD to discuss hysteroscopy dilation and curettage. Reviewed this procedure with patient to best of my abilities. Patient most comfortable with this option, declines in office biopsy. Will schedule consult with MD.    She has fibromyalgia, affecting her pain tolerance, and cannot take ibuprofen or other anti-inflammatories due to chronic stomach inflammation.     Pap:2024 NILM, negative HPV  Contraception: n/a  Mammogram 2024 normal    Narrative   PROCEDURE: US PELVIS W EV (CPT=76856/12538)     COMPARISON: None.     INDICATIONS:  Last menstrual period reported 3/1/2024. Postmenopausal bleeding     TECHNIQUE: Pelvic ultrasound using transabdominal and transvaginal technique.  A transvaginal scan was performed for endometrial and adnexal     FINDINGS:  UTERUS:   Measures 6.2 x 3.2 x 3.7 cm     ENDOMETRIUM: Heterogeneous ill-defined endometrium measuring 11 mm.  Differential would  include  MYOMETRIUM: Anterior uterine body/fundal leiomyoma measuring 8 x 7 mm     OVARIES AND ADNEXA:  Bilateral diminished ovarian volume consistent with mild atrophic changes.     RIGHT:   Measures 2.3 x 1.2 x 1.6 cm. Normal appearance with no masses.    LEFT:   Measures 2.2 x 1.1 x 1.2 cm.  Benign ovarian cyst measuring 0.8 cm     CUL-DE-SAC:   Normal.  No free fluid or mass.    OTHER: Negative.  Bladder appears normal.                 Impression   CONCLUSION:  1. Small subcentimeter uterine leiomyoma anterior body fundal region.  2. Heterogeneous ill-defined thickened endometrium for postmenopausal status measures 11 mm.  Differential would include medication induced endometrial changes atypical hyperplasia versus endometrial neoplasm.  Recommend histologic correlation..  3. Mildly atrophic bilateral ovaries..  Benign subcentimeter cyst left ovary..             Dictated by (CST): Fredi Dye MD on 2025 at 4:46 PM      Finalized by (CST): Fredi Dye MD on 2025 at 4:53 PM  PLEASE FAX REPORT         OBSTETRICS HISTORY:  OB History    Para Term  AB Living   1 1 1 0 0 1   SAB IAB Ectopic Multiple Live Births   0 0 0 0 1       GYNE HISTORY:  Period Cycle (Days): postmenopausa 2024 (2025  1:29 PM)      History   Sexual Activity    Sexual activity: Yes    Partners: Male       MEDICAL HISTORY:  Past Medical History:    Anxiety    Depression       SOCIAL HISTORY:  Social History     Socioeconomic History    Marital status: Single     Spouse name: Not on file    Number of children: Not on file    Years of education: Not on file    Highest education level: Not on file   Occupational History    Not on file   Tobacco Use    Smoking status: Former    Smokeless tobacco: Never   Vaping Use    Vaping status: Never Used   Substance and Sexual Activity    Alcohol use: Yes     Comment: socially     Drug use: Yes     Comment: RSO oil    Sexual activity: Yes     Partners: Male    Other Topics Concern    Not on file   Social History Narrative    Not on file     Social Drivers of Health     Food Insecurity: Not on file   Transportation Needs: Not on file   Stress: Not on file   Housing Stability: Not on file       MEDICATIONS:    Current Outpatient Medications:     gabapentin 100 MG Oral Cap, TAKE 1 CAPSULE BY MOUTH NIGHTLY. TAKE WITH GABAPENTIN 400MG CAPLET FOR NIGHLTY DOSE OF 500MG, Disp: 90 capsule, Rfl: 3    ALPRAZolam 0.25 MG Oral Tab, Take 1 tablet (0.25 mg total) by mouth 2 (two) times daily as needed for Sleep or Anxiety., Disp: 60 tablet, Rfl: 0    gabapentin 400 MG Oral Cap, Take 1 capsule (400 mg total) by mouth 3 (three) times daily., Disp: 270 capsule, Rfl: 3    Cholecalciferol (VITAMIN D) 25 MCG (1000 UT) Oral Tab, Take 3,000 Units by mouth daily., Disp: , Rfl:     Aloe Vera External Gel, Apply topically. (Patient not taking: Reported on 6/3/2025), Disp: , Rfl:     ALLERGIES:    Allergies   Allergen Reactions    Lactose Intolerant DIARRHEA, FATIGUE, NAUSEA ONLY, PAIN, RESTLESSNESS and SWELLING    Gluten Flour OTHER (SEE COMMENTS)     intolerance    Mold Runny nose     congestion         Review of Systems:  Constitutional:  Denies fatigue, night sweats, hot flashes  Cardiovascular:  denies chest pain or palpitations  Respiratory:  denies shortness of breath  Gastrointestinal:  denies heartburn, abdominal pain, diarrhea or constipation  Genitourinary:  denies dysuria, incontinence, abnormal vaginal discharge, vaginal itching see HPI  Musculoskeletal:  denies back pain.  Skin/Breast:  Denies any breast pain, lumps, or discharge.   Neurological:  denies headaches, extremity weakness or numbness.  Psychiatric: denies depression or anxiety.  Endocrine:   denies excessive thirst or urination.  Heme/Lymph:  denies history of anemia, easy bruising or bleeding.      PHYSICAL EXAM:     Vitals:    06/03/25 1214   BP: 107/75   Pulse: 64   Weight: 194 lb 3.2 oz (88.1 kg)     Body mass index  is 31.34 kg/m².         Constitutional: well developed, well nourished    Psychiatric:  Oriented to time, place, person and situation. Appropriate mood and affect          Assessment & Plan:    ICD-10-CM    1. Postmenopausal bleeding  N95.0           Assessment & Plan  Reviewed concern with postmenopausal bleeding - recommend endometrial sampling.    Reviewed option for endometrial biopsy vs. Consult with MD for hysteroscopy. Prefers consult for hysteroscopy with MD.    Ovarian Cyst Small left ovarian cyst, likely benign. Monitoring recommended. - Repeat pelvic ultrasound in six months to monitor the ovarian cyst.       ALVARO Arreguin        This note was prepared using Dragon Medical voice recognition dictation software. As a result errors may occur. When identified these errors have been corrected. While every attempt is made to correct errors during dictation discrepancies may still exist.

## 2025-06-06 ENCOUNTER — TELEMEDICINE (OUTPATIENT)
Dept: OBGYN CLINIC | Facility: CLINIC | Age: 45
End: 2025-06-06
Payer: COMMERCIAL

## 2025-06-06 ENCOUNTER — TELEPHONE (OUTPATIENT)
Dept: OBGYN CLINIC | Facility: CLINIC | Age: 45
End: 2025-06-06

## 2025-06-06 DIAGNOSIS — R93.89 THICKENED ENDOMETRIUM: Primary | ICD-10-CM

## 2025-06-06 DIAGNOSIS — M79.7 FIBROMYALGIA: ICD-10-CM

## 2025-06-06 DIAGNOSIS — N92.4 ABNORMAL PERIMENOPAUSAL BLEEDING: ICD-10-CM

## 2025-06-06 RX ORDER — ESTRADIOL 0.1 MG/G
CREAM VAGINAL
Qty: 42.5 G | Refills: 0 | Status: SHIPPED | OUTPATIENT
Start: 2025-06-06

## 2025-06-07 NOTE — PROGRESS NOTES
Nikia Wasserman is a 45 year old female here today for a telemedicine/video visit.     Seen EMB 6/3/25 for postmenopausal bleeding.   Had irregular periods.  LMP 3/1/25 that was very light.  She had some pink discharge x 1 day in 12/2024 and in 2/2025.    She then had spotting for 4-5 days on 4/2025, in timing with her daughter period.  Also had some spotting with intercourse.   She was treated for bacterial vaginosis on 5/2/25.      Pelvic ultrasound on 5/20/25--heterogeneous ill defined endometrium 11 mm.  8mm fibroid.  Normal ovaries.    FSH on 5/14/24--menopausal range.      Pt with fibromyalgia and on gabapentin.  She declined e-bx at office with EMB due to pain.    Here to discuss hysteroscopy and D&C    Pt also having some hot flashes and decreased libido.  Under increased stress.  Having vaginal dryness.  Wants estrogen cream.    Impression:  Perimenopausal bleeding.  Thickened endometrium.  Vaginal dryness    Plan:  will schedule hysteroscopy with D&C.  Risks discussed.  Since pt unable to take NSAIDs and very concerned about post op pain, discussed home with a few narcotics.  Rx Estrace vaginal cream.      Total time in discussion:  28m 46s

## 2025-06-07 NOTE — TELEPHONE ENCOUNTER
Please schedule the following surgery:    Procedure: Hysteroscopy with possible Truclear and D&C    Date:     Diagnosis:  Thickened endometrium.  Perimenopausal bleeding.  Fibromyalgia    Admission:Day surgery    Anesth: general    Preop Medical Clearance needed:  No    PCN allergy:  no antibiotic needed    Additional Orders: routine orders    Additional equipment:  Truclear    Rep needed: No    Additional surgery time needed: No    IPA tubal / hyst form signed: not applicable    Comments / Orders to Nurse:      Discussed possible complications including but not limited to:  anesthesia risks, bleeding, infection, and perforation of uterus    Follow-up: 2 weeks

## 2025-06-09 NOTE — TELEPHONE ENCOUNTER
Soonest available is Mon,7/21. To Dr. Ray please advise if this is OK of is their any where else on your schedule we can add patient

## 2025-06-09 NOTE — TELEPHONE ENCOUNTER
Spoke to patient. Aware surgery is scheduled on Tues,8/5/25 at 730am. Assisted in scheduling post-op appointment.    Per [employer], NO prior authorization is needed for surgery.  No Action Required  Reference Number  [id number]      Minor case instructions sent via [employer]    Delayed staff message sent to MD to please place pre-op orders    Entered in book and calendar

## 2025-06-09 NOTE — TELEPHONE ENCOUNTER
Spoke to patient. States surgery HAS to be scheduled on a Tues.    Please advise if OK to add post call on: Tues,8/5 in AM

## 2025-06-13 DIAGNOSIS — F41.9 ANXIETY: ICD-10-CM

## 2025-06-16 RX ORDER — ALPRAZOLAM 0.25 MG
0.25 TABLET ORAL 2 TIMES DAILY PRN
Qty: 60 TABLET | Refills: 0 | Status: SHIPPED | OUTPATIENT
Start: 2025-06-16

## 2025-06-16 RX ORDER — GABAPENTIN 400 MG/1
400 CAPSULE ORAL 3 TIMES DAILY
Qty: 270 CAPSULE | Refills: 3 | Status: SHIPPED | OUTPATIENT
Start: 2025-06-16

## 2025-06-16 NOTE — TELEPHONE ENCOUNTER
Refill failed protocol. Please review due High Duplicate Therapy: gabapentin GABAPENTIN AND RELATED. Abuse/Dependency Potential.    Recent fills each # 60 : 3/18/25  Last prescription written: 3/18/25  Last office visit:  12/17/2024    Future Appointments   Date Time Provider Department Center   8/19/2025  6:40 PM Conchis Ray MD [facility]   9/9/2025  1:30 PM Nara Gonzalez MD [facility]

## 2025-07-24 ENCOUNTER — TELEPHONE (OUTPATIENT)
Dept: OBGYN CLINIC | Facility: CLINIC | Age: 45
End: 2025-07-24

## 2025-08-12 ENCOUNTER — PATIENT MESSAGE (OUTPATIENT)
Dept: FAMILY MEDICINE CLINIC | Facility: CLINIC | Age: 45
End: 2025-08-12

## 2025-08-12 ENCOUNTER — NURSE TRIAGE (OUTPATIENT)
Dept: FAMILY MEDICINE CLINIC | Facility: CLINIC | Age: 45
End: 2025-08-12

## 2025-08-14 ENCOUNTER — OFFICE VISIT (OUTPATIENT)
Dept: INTERNAL MEDICINE CLINIC | Facility: CLINIC | Age: 45
End: 2025-08-14

## 2025-08-14 VITALS
WEIGHT: 192 LBS | HEIGHT: 66 IN | TEMPERATURE: 99 F | SYSTOLIC BLOOD PRESSURE: 106 MMHG | OXYGEN SATURATION: 100 % | HEART RATE: 64 BPM | DIASTOLIC BLOOD PRESSURE: 75 MMHG | BODY MASS INDEX: 30.86 KG/M2

## 2025-08-14 DIAGNOSIS — S80.862A INSECT BITE OF LEFT LOWER LEG, INITIAL ENCOUNTER: Primary | ICD-10-CM
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
## 2025-08-14 PROCEDURE — 3074F SYST BP LT 130 MM HG: CPT | Performed by: NURSE PRACTITIONER

## 2025-08-14 PROCEDURE — 3008F BODY MASS INDEX DOCD: CPT | Performed by: NURSE PRACTITIONER

## 2025-08-14 PROCEDURE — 99213 OFFICE O/P EST LOW 20 MIN: CPT | Performed by: NURSE PRACTITIONER

## 2025-08-14 PROCEDURE — 3078F DIAST BP <80 MM HG: CPT | Performed by: NURSE PRACTITIONER

## 2025-08-19 ENCOUNTER — OFFICE VISIT (OUTPATIENT)
Dept: OBGYN CLINIC | Facility: CLINIC | Age: 45
End: 2025-08-19

## 2025-08-19 VITALS
BODY MASS INDEX: 31 KG/M2 | WEIGHT: 194.38 LBS | DIASTOLIC BLOOD PRESSURE: 72 MMHG | HEART RATE: 68 BPM | SYSTOLIC BLOOD PRESSURE: 104 MMHG

## 2025-08-19 DIAGNOSIS — N76.3 SUBACUTE VULVITIS: Primary | ICD-10-CM

## 2025-08-19 DIAGNOSIS — N92.4 ABNORMAL PERIMENOPAUSAL BLEEDING: ICD-10-CM

## 2025-08-19 PROCEDURE — 99213 OFFICE O/P EST LOW 20 MIN: CPT | Performed by: OBSTETRICS & GYNECOLOGY

## 2025-08-19 PROCEDURE — 3078F DIAST BP <80 MM HG: CPT | Performed by: OBSTETRICS & GYNECOLOGY

## 2025-08-19 PROCEDURE — 3074F SYST BP LT 130 MM HG: CPT | Performed by: OBSTETRICS & GYNECOLOGY

## (undated) NOTE — LETTER
AUTHORIZATION FOR SURGICAL OPERATION OR OTHER PROCEDURE    1. I hereby authorize Dr. WHITING PHYSICIAN:[id number]}, and [facility] staff assigned to my case to perform the following operation and/or procedure at the [facility] site:    _______________________________________________________________________________________________      _______________________________________________________________________________________________    2.  My physician has explained the nature and purpose of the operation or other procedure, possible alternative methods of treatment, the risks involved, and the possibility of complication to me.  I acknowledge that no guarantee has been made as to the result that may be obtained.  3.  I recognize that, during the course of this operation, or other procedure, unforseen conditions may necessitate additional or different procedure than those listed above.  I, therefore, further authorize and request that the above named physician, his/her physician assistants or designees perform such procedures as are, in his/her professional opinion, necessary and desirable.  4.  Any tissue or organs removed in the operation or other procedure may be disposed of by and at the discretion of the [facility] and [facility].  5.  I understand that in the event of a medical emergency, I will be transported by local paramedics to [facility] or other hospital emergency department.  6.  I certify that I have read and fully understand the above consent to operation and/or other procedure.    7.  I acknowledge that my physician has explained sedation/analgesia administration to me including the risks and benefits.  I consent to the administration of sedation/analgesia as may be necessary or desirable in the judgement of my physician.    Witness signature: ___________________________________________________ Date:  ______/______/_____                    Time:   ________ A.M.  P.M.       Patient Name:  ______________________________________________________  (please print)      Patient signature:  ___________________________________________________             Relationship to Patient:           []  Parent    Responsible person                          []  Spouse  In case of minor or                    [] Other  _____________   Incompetent name:  __________________________________________________                               (please print)      _____________      Responsible person  In case of minor or  Incompetent signature:  _______________________________________________    Statement of Physician  My signature below affirms that prior to the time of the procedure, I have explained to the patient and/or his/her guardian, the risks and benefits involved in the proposed treatment and any reasonable alternative to the proposed treatment.  I have also explained the risks and benefits involved in the refusal of the proposed treatment and have answered the patient's questions.                        Date:  ______/______/_______  Provider                      Signature:  __________________________________________________________       Time:  ___________ A.M    P.M.

## (undated) NOTE — LETTER
MINOR CASE LETTER      6/9/2025        Dear Nikia,    Your are having a Hysteroscopy with possible Truclear and D&C on Tues,8/5/25 at 7:30am at [facility].    Do not eat or drink anything (including water) after midnight the night before surgery.    If your procedure is scheduled later in the day, then nothing by mouth for 6 hours before arrival to the hospital.    You are to call this office if you have any cold or flu symptoms 2 days before your scheduled surgery.    Please avoid ALL aspirin and herbal supplements 7 days before surgery.  Avoid Ibuprofen, Motrin, Aleve, or Naprosyn for 3 days before surgery.    Please avoid ALL Cannabis use 24 hours prior to surgery.    You cannot wear hair pins,wigs,artificial nail or metallic nails/ nail polish for surgery.    You will be contacted by PreAdmission Testing (PAT) usually within the week before surgery.  They will take a short medical history and let you know if any preoperative testing is needed. If you have any questions for preadmission testing please feel free to contact them by calling [phone].    Per your insurance no prior authorization is needed for surgery.    Please make arrangements for someone to drive you home after your surgery.    Call our office now to schedule your post-operative appointment for 2 weeks after surgery.    Please feel free to contact our office at [phone] if you have any questions regarding these instructions or your procedure.      Sincerely,          Conchis Ray MD  [facility]  [address] [place] [id number] [phone]

## (undated) NOTE — LETTER
AUTHORIZATION FOR SURGICAL OPERATION OR OTHER PROCEDURE    1. I hereby authorize PAP[facility] MILLER, and [facility] staff assigned to my case to perform the following operation and/or procedure at the [facility] site:    _______________________________________________________________________________________________    ENDOMETRIAL BIOPSY  _______________________________________________________________________________________________    2.  My physician has explained the nature and purpose of the operation or other procedure, possible alternative methods of treatment, the risks involved, and the possibility of complication to me.  I acknowledge that no guarantee has been made as to the result that may be obtained.  3.  I recognize that, during the course of this operation, or other procedure, unforseen conditions may necessitate additional or different procedure than those listed above.  I, therefore, further authorize and request that the above named physician, his/her physician assistants or designees perform such procedures as are, in his/her professional opinion, necessary and desirable.  4.  Any tissue or organs removed in the operation or other procedure may be disposed of by and at the discretion of the [facility] and [facility].  5.  I understand that in the event of a medical emergency, I will be transported by local paramedics to [facility] or other hospital emergency department.  6.  I certify that I have read and fully understand the above consent to operation and/or other procedure.    7.  I acknowledge that my physician has explained sedation/analgesia administration to me including the risks and benefits.  I consent to the administration of sedation/analgesia as may be necessary or desirable in the judgement of my physician.    Witness signature: ___________________________________________________ Date:  ______/______/_____                     Time:  ________ A.M.  P.M.       Patient Name:  ______________________________________________________  (please print)      Patient signature:  ___________________________________________________             Relationship to Patient:           []  Parent    Responsible person                          []  Spouse  In case of minor or                    [] Other  _____________   Incompetent name:  __________________________________________________                               (please print)      _____________      Responsible person  In case of minor or  Incompetent signature:  _______________________________________________    Statement of Physician  My signature below affirms that prior to the time of the procedure, I have explained to the patient and/or his/her guardian, the risks and benefits involved in the proposed treatment and any reasonable alternative to the proposed treatment.  I have also explained the risks and benefits involved in the refusal of the proposed treatment and have answered the patient's questions.                        Date:  ______/______/_______  Provider                      Signature:  __________________________________________________________       Time:  ___________ A.M    P.M.

## (undated) NOTE — LETTER
10/30/2023          To Whom It May Concern:    Sima Tinsley is currently under my medical care. Nima Singh does suffer from Fibromyalgia which will worsen in the winter due to the cold temperatures. She will take necessary measures to keep herself with normal temperatures such as warming her car before entering it with a car start. It is part of her routine care and treatment for Fibromyalgia. If you require additional information please contact our office.         Sincerely,    Janel Cannon MD          Document generated by:  Janel Cannon MD

## (undated) NOTE — LETTER
10/30/2023      To Whom It May Concern:    Amanda Hansen suffers from Fibromyalgia which will worsen in the winter due to the cold temperatures. Her body does not regulate temperature effectively and she cannot allow her body temperature to fall in cold weather. She needs to take necessary measures to keep herself within normal temperatures such as warming her car before entering it with a car start. Failure to do so will result in fibromyalgia flare up, significant pain, and mobility impairment. It is a necessary part of her routine care and treatment for Fibromyalgia and it's many symptoms. If you require additional information please contact our office. Sincerely,    Vilma Frankel MD  No primary provider on file.    [phone]      Document generated by:  Jorge De La Torre RN

## (undated) NOTE — LETTER
11/2/2023          To Whom It May Concern:    Nikia Wasserman is currently under my medical care. Nikia first saw me in 2/24/2022 and had already been diagnosed with Fibromyalgia in 4/27/2021 by Rheumatologist Dr. Severino. At that time, her symptoms included depression, anxiety, panic attack, insomnia, fatigue, and diffuse myofascial discomfort. The exact pathophysiology of Fibromyalgia is unknown and still being researched. Often a traumatic event or virus can trigger Fibromyalgia. It causes widespread muscle pain and tenderness along with fatigue, depression, anxiety, insomnia, memory issues and headaches.   Nikia's treatment has included medications, physical therapy, behavioral therapy and alternative medicine like acupuncture and chiropractic care. Cold temperatures trigger more pain for Nikia and will exacerbate her symptoms.     If you require additional information please contact our office.        Sincerely,    Nara Gonzalez MD          Document generated by:  Nara Gonzalez MD